# Patient Record
Sex: MALE | Race: WHITE | Employment: OTHER | URBAN - NONMETROPOLITAN AREA
[De-identification: names, ages, dates, MRNs, and addresses within clinical notes are randomized per-mention and may not be internally consistent; named-entity substitution may affect disease eponyms.]

---

## 2019-03-25 ENCOUNTER — HOSPITAL ENCOUNTER (EMERGENCY)
Age: 74
Discharge: ANOTHER HOSPITAL | End: 2019-03-25
Attending: EMERGENCY MEDICINE
Payer: MEDICARE

## 2019-03-25 ENCOUNTER — APPOINTMENT (OUTPATIENT)
Dept: ULTRASOUND IMAGING | Age: 74
DRG: 439 | End: 2019-03-25
Attending: INTERNAL MEDICINE
Payer: MEDICARE

## 2019-03-25 ENCOUNTER — HOSPITAL ENCOUNTER (INPATIENT)
Age: 74
LOS: 4 days | Discharge: HOME | DRG: 439 | End: 2019-03-29
Attending: INTERNAL MEDICINE | Admitting: INTERNAL MEDICINE
Payer: MEDICARE

## 2019-03-25 ENCOUNTER — APPOINTMENT (OUTPATIENT)
Dept: CT IMAGING | Age: 74
End: 2019-03-25
Payer: MEDICARE

## 2019-03-25 VITALS
SYSTOLIC BLOOD PRESSURE: 138 MMHG | TEMPERATURE: 100.3 F | HEART RATE: 72 BPM | RESPIRATION RATE: 20 BRPM | DIASTOLIC BLOOD PRESSURE: 70 MMHG | OXYGEN SATURATION: 99 %

## 2019-03-25 DIAGNOSIS — K85.00 IDIOPATHIC ACUTE PANCREATITIS WITHOUT INFECTION OR NECROSIS: Primary | ICD-10-CM

## 2019-03-25 DIAGNOSIS — D72.829 LEUKOCYTOSIS, UNSPECIFIED TYPE: ICD-10-CM

## 2019-03-25 DIAGNOSIS — I95.9 HYPOTENSION, UNSPECIFIED HYPOTENSION TYPE: ICD-10-CM

## 2019-03-25 DIAGNOSIS — N17.9 ACUTE KIDNEY INJURY (HCC): ICD-10-CM

## 2019-03-25 PROBLEM — K85.90 PANCREATITIS, UNSPECIFIED PANCREATITIS TYPE: Status: ACTIVE | Noted: 2019-03-25

## 2019-03-25 LAB
-: NORMAL
ABSOLUTE EOS #: 0 K/UL (ref 0–0.44)
ABSOLUTE IMMATURE GRANULOCYTE: 0 K/UL (ref 0–0.3)
ABSOLUTE LYMPH #: 1.46 K/UL (ref 1.1–3.7)
ABSOLUTE MONO #: 1.7 K/UL (ref 0.1–1.2)
ALBUMIN SERPL-MCNC: 3.8 G/DL (ref 3.5–5.2)
ALBUMIN SERPL-MCNC: 4.1 G/DL (ref 3.5–5.1)
ALBUMIN/GLOBULIN RATIO: 1.2 (ref 1–2.5)
ALP BLD-CCNC: 145 U/L (ref 40–129)
ALP BLD-CCNC: 165 U/L (ref 38–126)
ALT SERPL-CCNC: 651 U/L (ref 5–41)
ALT SERPL-CCNC: 659 U/L (ref 11–66)
ANION GAP SERPL CALCULATED.3IONS-SCNC: 18 MEQ/L (ref 8–16)
ANION GAP SERPL CALCULATED.3IONS-SCNC: 19 MMOL/L (ref 9–17)
AST SERPL-CCNC: 607 U/L (ref 5–40)
AST SERPL-CCNC: 656 U/L
BASOPHILS # BLD: 0 % (ref 0–2)
BASOPHILS # BLD: 0.2 %
BASOPHILS ABSOLUTE: 0 K/UL (ref 0–0.2)
BASOPHILS ABSOLUTE: 0.1 THOU/MM3 (ref 0–0.1)
BILIRUB SERPL-MCNC: 2.59 MG/DL (ref 0.3–1.2)
BILIRUB SERPL-MCNC: 3.1 MG/DL (ref 0.3–1.2)
BILIRUBIN DIRECT: 2.3 MG/DL (ref 0–0.3)
BUN BLDV-MCNC: 28 MG/DL (ref 7–22)
BUN BLDV-MCNC: 28 MG/DL (ref 8–23)
BUN/CREAT BLD: 15 (ref 9–20)
CALCIUM SERPL-MCNC: 8.6 MG/DL (ref 8.5–10.5)
CALCIUM SERPL-MCNC: 9 MG/DL (ref 8.6–10.4)
CHLORIDE BLD-SCNC: 101 MEQ/L (ref 98–111)
CHLORIDE BLD-SCNC: 99 MMOL/L (ref 98–107)
CO2: 16 MMOL/L (ref 20–31)
CO2: 17 MEQ/L (ref 23–33)
CREAT SERPL-MCNC: 1.4 MG/DL (ref 0.4–1.2)
CREAT SERPL-MCNC: 1.85 MG/DL (ref 0.7–1.2)
DIFFERENTIAL TYPE: ABNORMAL
EOSINOPHIL # BLD: 0 %
EOSINOPHILS ABSOLUTE: 0 THOU/MM3 (ref 0–0.4)
EOSINOPHILS RELATIVE PERCENT: 0 % (ref 1–4)
ERYTHROCYTE [DISTWIDTH] IN BLOOD BY AUTOMATED COUNT: 14.8 % (ref 11.5–14.5)
ERYTHROCYTE [DISTWIDTH] IN BLOOD BY AUTOMATED COUNT: 47.6 FL (ref 35–45)
GFR AFRICAN AMERICAN: 44 ML/MIN
GFR NON-AFRICAN AMERICAN: 36 ML/MIN
GFR SERPL CREATININE-BSD FRML MDRD: 50 ML/MIN/1.73M2
GFR SERPL CREATININE-BSD FRML MDRD: ABNORMAL ML/MIN/{1.73_M2}
GFR SERPL CREATININE-BSD FRML MDRD: ABNORMAL ML/MIN/{1.73_M2}
GLUCOSE BLD-MCNC: 126 MG/DL (ref 70–99)
GLUCOSE BLD-MCNC: 140 MG/DL (ref 70–108)
GLUCOSE BLD-MCNC: 141 MG/DL (ref 70–108)
GLUCOSE BLD-MCNC: 185 MG/DL (ref 70–108)
HCT VFR BLD CALC: 44.1 % (ref 42–52)
HCT VFR BLD CALC: 44.2 % (ref 40.7–50.3)
HEMOGLOBIN: 14.6 G/DL (ref 13–17)
HEMOGLOBIN: 15.3 GM/DL (ref 14–18)
IMMATURE GRANS (ABS): 0.24 THOU/MM3 (ref 0–0.07)
IMMATURE GRANULOCYTES: 0 %
IMMATURE GRANULOCYTES: 0.9 %
LACTIC ACID: 1.3 MMOL/L (ref 0.5–2.2)
LACTIC ACID: 2.5 MMOL/L (ref 0.5–2.2)
LACTIC ACID: 5.5 MMOL/L (ref 0.5–2.2)
LIPASE: >3000 U/L (ref 13–60)
LYMPHOCYTES # BLD: 3 %
LYMPHOCYTES # BLD: 6 % (ref 24–43)
LYMPHOCYTES ABSOLUTE: 0.8 THOU/MM3 (ref 1–4.8)
MCH RBC QN AUTO: 30.4 PG (ref 25.2–33.5)
MCH RBC QN AUTO: 30.6 PG (ref 26–33)
MCHC RBC AUTO-ENTMCNC: 33 G/DL (ref 28.4–34.8)
MCHC RBC AUTO-ENTMCNC: 34.7 GM/DL (ref 32.2–35.5)
MCV RBC AUTO: 88.2 FL (ref 80–94)
MCV RBC AUTO: 91.9 FL (ref 82.6–102.9)
MONOCYTES # BLD: 4.4 %
MONOCYTES # BLD: 7 % (ref 3–12)
MONOCYTES ABSOLUTE: 1.2 THOU/MM3 (ref 0.4–1.3)
MORPHOLOGY: NORMAL
MRSA SCREEN RT-PCR: NEGATIVE
NRBC AUTOMATED: 0 PER 100 WBC
NUCLEATED RED BLOOD CELLS: 0 /100 WBC
PDW BLD-RTO: 14.4 % (ref 11.8–14.4)
PLATELET # BLD: 159 THOU/MM3 (ref 130–400)
PLATELET # BLD: 230 K/UL (ref 138–453)
PLATELET ESTIMATE: ABNORMAL
PMV BLD AUTO: 10.4 FL (ref 9.4–12.4)
PMV BLD AUTO: 10.9 FL (ref 8.1–13.5)
POTASSIUM REFLEX MAGNESIUM: 4.8 MEQ/L (ref 3.5–5.2)
POTASSIUM SERPL-SCNC: 3.9 MMOL/L (ref 3.7–5.3)
RBC # BLD: 4.81 M/UL (ref 4.21–5.77)
RBC # BLD: 5 MILL/MM3 (ref 4.7–6.1)
RBC # BLD: ABNORMAL 10*6/UL
REASON FOR REJECTION: NORMAL
SCAN OF BLOOD SMEAR: NORMAL
SEG NEUTROPHILS: 87 % (ref 36–65)
SEG NEUTROPHILS: 91.5 %
SEGMENTED NEUTROPHILS ABSOLUTE COUNT: 21.14 K/UL (ref 1.5–8.1)
SEGMENTED NEUTROPHILS ABSOLUTE COUNT: 24 THOU/MM3 (ref 1.8–7.7)
SODIUM BLD-SCNC: 134 MMOL/L (ref 135–144)
SODIUM BLD-SCNC: 136 MEQ/L (ref 135–145)
TOTAL PROTEIN: 6.9 G/DL (ref 6.1–8)
TOTAL PROTEIN: 7 G/DL (ref 6.4–8.3)
TROPONIN INTERP: NORMAL
TROPONIN T: <0.03 NG/ML
TROPONIN, HIGH SENSITIVITY: NORMAL NG/L (ref 0–22)
VANCOMYCIN RESISTANT ENTEROCOCCUS: NEGATIVE
WBC # BLD: 24.3 K/UL (ref 3.5–11.3)
WBC # BLD: 26.2 THOU/MM3 (ref 4.8–10.8)
WBC # BLD: ABNORMAL 10*3/UL
ZZ NTE CLEAN UP: ORDERED TEST: NORMAL
ZZ NTE WITH NAME CLEAN UP: SPECIMEN SOURCE: NORMAL

## 2019-03-25 PROCEDURE — 2580000003 HC RX 258: Performed by: NURSE PRACTITIONER

## 2019-03-25 PROCEDURE — 76770 US EXAM ABDO BACK WALL COMP: CPT

## 2019-03-25 PROCEDURE — 83690 ASSAY OF LIPASE: CPT

## 2019-03-25 PROCEDURE — 93005 ELECTROCARDIOGRAM TRACING: CPT

## 2019-03-25 PROCEDURE — 87149 DNA/RNA DIRECT PROBE: CPT

## 2019-03-25 PROCEDURE — 99223 1ST HOSP IP/OBS HIGH 75: CPT | Performed by: INTERNAL MEDICINE

## 2019-03-25 PROCEDURE — 76705 ECHO EXAM OF ABDOMEN: CPT

## 2019-03-25 PROCEDURE — 6360000002 HC RX W HCPCS

## 2019-03-25 PROCEDURE — 6360000002 HC RX W HCPCS: Performed by: EMERGENCY MEDICINE

## 2019-03-25 PROCEDURE — 87081 CULTURE SCREEN ONLY: CPT

## 2019-03-25 PROCEDURE — 2000000000 HC ICU R&B

## 2019-03-25 PROCEDURE — 82948 REAGENT STRIP/BLOOD GLUCOSE: CPT

## 2019-03-25 PROCEDURE — 80048 BASIC METABOLIC PNL TOTAL CA: CPT

## 2019-03-25 PROCEDURE — 96375 TX/PRO/DX INJ NEW DRUG ADDON: CPT

## 2019-03-25 PROCEDURE — 74176 CT ABD & PELVIS W/O CONTRAST: CPT

## 2019-03-25 PROCEDURE — 87205 SMEAR GRAM STAIN: CPT

## 2019-03-25 PROCEDURE — 87500 VANOMYCIN DNA AMP PROBE: CPT

## 2019-03-25 PROCEDURE — 87077 CULTURE AEROBIC IDENTIFY: CPT

## 2019-03-25 PROCEDURE — 84484 ASSAY OF TROPONIN QUANT: CPT

## 2019-03-25 PROCEDURE — 6360000002 HC RX W HCPCS: Performed by: NURSE PRACTITIONER

## 2019-03-25 PROCEDURE — 87186 SC STD MICRODIL/AGAR DIL: CPT

## 2019-03-25 PROCEDURE — 80076 HEPATIC FUNCTION PANEL: CPT

## 2019-03-25 PROCEDURE — 94761 N-INVAS EAR/PLS OXIMETRY MLT: CPT

## 2019-03-25 PROCEDURE — 6360000002 HC RX W HCPCS: Performed by: INTERNAL MEDICINE

## 2019-03-25 PROCEDURE — 85025 COMPLETE CBC W/AUTO DIFF WBC: CPT

## 2019-03-25 PROCEDURE — 2580000003 HC RX 258: Performed by: EMERGENCY MEDICINE

## 2019-03-25 PROCEDURE — 80053 COMPREHEN METABOLIC PANEL: CPT

## 2019-03-25 PROCEDURE — 96368 THER/DIAG CONCURRENT INF: CPT

## 2019-03-25 PROCEDURE — 36415 COLL VENOUS BLD VENIPUNCTURE: CPT

## 2019-03-25 PROCEDURE — 99285 EMERGENCY DEPT VISIT HI MDM: CPT

## 2019-03-25 PROCEDURE — APPSS180 APP SPLIT SHARED TIME > 60 MINUTES: Performed by: NURSE PRACTITIONER

## 2019-03-25 PROCEDURE — 87641 MR-STAPH DNA AMP PROBE: CPT

## 2019-03-25 PROCEDURE — 96365 THER/PROPH/DIAG IV INF INIT: CPT

## 2019-03-25 PROCEDURE — 87040 BLOOD CULTURE FOR BACTERIA: CPT

## 2019-03-25 PROCEDURE — 83605 ASSAY OF LACTIC ACID: CPT

## 2019-03-25 PROCEDURE — 2709999900 HC NON-CHARGEABLE SUPPLY

## 2019-03-25 RX ORDER — FENTANYL CITRATE 50 UG/ML
25 INJECTION, SOLUTION INTRAMUSCULAR; INTRAVENOUS ONCE
Status: COMPLETED | OUTPATIENT
Start: 2019-03-25 | End: 2019-03-25

## 2019-03-25 RX ORDER — SODIUM CHLORIDE, SODIUM LACTATE, POTASSIUM CHLORIDE, CALCIUM CHLORIDE 600; 310; 30; 20 MG/100ML; MG/100ML; MG/100ML; MG/100ML
INJECTION, SOLUTION INTRAVENOUS ONCE
Status: DISCONTINUED | OUTPATIENT
Start: 2019-03-25 | End: 2019-03-25

## 2019-03-25 RX ORDER — MEPERIDINE HYDROCHLORIDE 25 MG/ML
50 INJECTION INTRAMUSCULAR; INTRAVENOUS; SUBCUTANEOUS EVERY 4 HOURS PRN
Status: DISCONTINUED | OUTPATIENT
Start: 2019-03-25 | End: 2019-03-29 | Stop reason: HOSPADM

## 2019-03-25 RX ORDER — LISINOPRIL 10 MG/1
10 TABLET ORAL DAILY
COMMUNITY

## 2019-03-25 RX ORDER — NICOTINE POLACRILEX 4 MG
15 LOZENGE BUCCAL PRN
Status: DISCONTINUED | OUTPATIENT
Start: 2019-03-25 | End: 2019-03-29 | Stop reason: HOSPADM

## 2019-03-25 RX ORDER — MORPHINE SULFATE 2 MG/ML
INJECTION, SOLUTION INTRAMUSCULAR; INTRAVENOUS
Status: COMPLETED
Start: 2019-03-25 | End: 2019-03-25

## 2019-03-25 RX ORDER — MEPERIDINE HYDROCHLORIDE 50 MG/ML
50 INJECTION INTRAMUSCULAR; INTRAVENOUS; SUBCUTANEOUS ONCE
Status: DISCONTINUED | OUTPATIENT
Start: 2019-03-25 | End: 2019-03-25

## 2019-03-25 RX ORDER — MEPERIDINE HYDROCHLORIDE 25 MG/ML
50 INJECTION INTRAMUSCULAR; INTRAVENOUS; SUBCUTANEOUS ONCE
Status: COMPLETED | OUTPATIENT
Start: 2019-03-25 | End: 2019-03-25

## 2019-03-25 RX ORDER — MORPHINE SULFATE 4 MG/ML
INJECTION, SOLUTION INTRAMUSCULAR; INTRAVENOUS
Status: COMPLETED
Start: 2019-03-25 | End: 2019-03-25

## 2019-03-25 RX ORDER — ONDANSETRON 2 MG/ML
4 INJECTION INTRAMUSCULAR; INTRAVENOUS EVERY 6 HOURS PRN
Status: DISCONTINUED | OUTPATIENT
Start: 2019-03-25 | End: 2019-03-29 | Stop reason: HOSPADM

## 2019-03-25 RX ORDER — DEXTROSE MONOHYDRATE 25 G/50ML
12.5 INJECTION, SOLUTION INTRAVENOUS PRN
Status: DISCONTINUED | OUTPATIENT
Start: 2019-03-25 | End: 2019-03-29 | Stop reason: HOSPADM

## 2019-03-25 RX ORDER — MORPHINE SULFATE 2 MG/ML
2 INJECTION, SOLUTION INTRAMUSCULAR; INTRAVENOUS ONCE
Status: COMPLETED | OUTPATIENT
Start: 2019-03-25 | End: 2019-03-25

## 2019-03-25 RX ORDER — SODIUM CHLORIDE, SODIUM LACTATE, POTASSIUM CHLORIDE, CALCIUM CHLORIDE 600; 310; 30; 20 MG/100ML; MG/100ML; MG/100ML; MG/100ML
INJECTION, SOLUTION INTRAVENOUS ONCE
Status: COMPLETED | OUTPATIENT
Start: 2019-03-25 | End: 2019-03-25

## 2019-03-25 RX ORDER — SODIUM CHLORIDE 9 MG/ML
INJECTION, SOLUTION INTRAVENOUS ONCE
Status: COMPLETED | OUTPATIENT
Start: 2019-03-25 | End: 2019-03-25

## 2019-03-25 RX ORDER — SODIUM CHLORIDE 9 MG/ML
INJECTION, SOLUTION INTRAVENOUS CONTINUOUS
Status: DISCONTINUED | OUTPATIENT
Start: 2019-03-25 | End: 2019-03-26

## 2019-03-25 RX ORDER — MEPERIDINE HYDROCHLORIDE 25 MG/ML
INJECTION INTRAMUSCULAR; INTRAVENOUS; SUBCUTANEOUS
Status: COMPLETED
Start: 2019-03-25 | End: 2019-03-25

## 2019-03-25 RX ORDER — MORPHINE SULFATE 4 MG/ML
4 INJECTION, SOLUTION INTRAMUSCULAR; INTRAVENOUS ONCE
Status: COMPLETED | OUTPATIENT
Start: 2019-03-25 | End: 2019-03-25

## 2019-03-25 RX ORDER — ONDANSETRON 2 MG/ML
4 INJECTION INTRAMUSCULAR; INTRAVENOUS ONCE
Status: COMPLETED | OUTPATIENT
Start: 2019-03-25 | End: 2019-03-25

## 2019-03-25 RX ORDER — MORPHINE SULFATE 2 MG/ML
2 INJECTION, SOLUTION INTRAMUSCULAR; INTRAVENOUS
Status: DISCONTINUED | OUTPATIENT
Start: 2019-03-25 | End: 2019-03-26

## 2019-03-25 RX ORDER — SODIUM CHLORIDE 0.9 % (FLUSH) 0.9 %
10 SYRINGE (ML) INJECTION PRN
Status: DISCONTINUED | OUTPATIENT
Start: 2019-03-25 | End: 2019-03-29 | Stop reason: HOSPADM

## 2019-03-25 RX ORDER — DEXTROSE MONOHYDRATE 50 MG/ML
100 INJECTION, SOLUTION INTRAVENOUS PRN
Status: DISCONTINUED | OUTPATIENT
Start: 2019-03-25 | End: 2019-03-29 | Stop reason: HOSPADM

## 2019-03-25 RX ORDER — SODIUM CHLORIDE 0.9 % (FLUSH) 0.9 %
10 SYRINGE (ML) INJECTION EVERY 12 HOURS SCHEDULED
Status: DISCONTINUED | OUTPATIENT
Start: 2019-03-25 | End: 2019-03-29 | Stop reason: HOSPADM

## 2019-03-25 RX ORDER — SIMVASTATIN 20 MG
20 TABLET ORAL NIGHTLY
COMMUNITY

## 2019-03-25 RX ADMIN — MEROPENEM 1 G: 1 INJECTION, POWDER, FOR SOLUTION INTRAVENOUS at 19:03

## 2019-03-25 RX ADMIN — ONDANSETRON 4 MG: 2 INJECTION INTRAMUSCULAR; INTRAVENOUS at 12:22

## 2019-03-25 RX ADMIN — SODIUM CHLORIDE: 9 INJECTION, SOLUTION INTRAVENOUS at 01:19

## 2019-03-25 RX ADMIN — MEPERIDINE HYDROCHLORIDE 50 MG: 25 INJECTION INTRAMUSCULAR; INTRAVENOUS; SUBCUTANEOUS at 12:19

## 2019-03-25 RX ADMIN — MORPHINE SULFATE 2 MG: 2 INJECTION, SOLUTION INTRAMUSCULAR; INTRAVENOUS at 18:59

## 2019-03-25 RX ADMIN — ONDANSETRON 4 MG: 2 INJECTION INTRAMUSCULAR; INTRAVENOUS at 01:19

## 2019-03-25 RX ADMIN — MORPHINE SULFATE: 4 INJECTION, SOLUTION INTRAMUSCULAR; INTRAVENOUS at 08:30

## 2019-03-25 RX ADMIN — SODIUM CHLORIDE: 9 INJECTION, SOLUTION INTRAVENOUS at 16:33

## 2019-03-25 RX ADMIN — MEPERIDINE HYDROCHLORIDE 50 MG: 25 INJECTION INTRAMUSCULAR; INTRAVENOUS; SUBCUTANEOUS at 16:22

## 2019-03-25 RX ADMIN — PIPERACILLIN SODIUM,TAZOBACTAM SODIUM 3.38 G: 3; .375 INJECTION, POWDER, FOR SOLUTION INTRAVENOUS at 02:19

## 2019-03-25 RX ADMIN — MORPHINE SULFATE: 2 INJECTION, SOLUTION INTRAMUSCULAR; INTRAVENOUS at 18:59

## 2019-03-25 RX ADMIN — MORPHINE SULFATE 4 MG: 4 INJECTION INTRAVENOUS at 08:25

## 2019-03-25 RX ADMIN — SODIUM CHLORIDE: 9 INJECTION, SOLUTION INTRAVENOUS at 10:50

## 2019-03-25 RX ADMIN — MEPERIDINE HYDROCHLORIDE: 25 INJECTION, SOLUTION INTRAMUSCULAR; INTRAVENOUS; SUBCUTANEOUS at 10:45

## 2019-03-25 RX ADMIN — MORPHINE SULFATE 2 MG: 2 INJECTION, SOLUTION INTRAMUSCULAR; INTRAVENOUS at 23:23

## 2019-03-25 RX ADMIN — SODIUM CHLORIDE, POTASSIUM CHLORIDE, SODIUM LACTATE AND CALCIUM CHLORIDE: 600; 310; 30; 20 INJECTION, SOLUTION INTRAVENOUS at 01:42

## 2019-03-25 RX ADMIN — MORPHINE SULFATE 2 MG: 2 INJECTION, SOLUTION INTRAMUSCULAR; INTRAVENOUS at 21:38

## 2019-03-25 RX ADMIN — FENTANYL CITRATE 25 MCG: 50 INJECTION, SOLUTION INTRAMUSCULAR; INTRAVENOUS at 04:26

## 2019-03-25 RX ADMIN — MEPERIDINE HYDROCHLORIDE 50 MG: 25 INJECTION, SOLUTION INTRAMUSCULAR; INTRAVENOUS; SUBCUTANEOUS at 10:51

## 2019-03-25 RX ADMIN — MEPERIDINE HYDROCHLORIDE 50 MG: 25 INJECTION INTRAMUSCULAR; INTRAVENOUS; SUBCUTANEOUS at 20:03

## 2019-03-25 ASSESSMENT — ENCOUNTER SYMPTOMS
SORE THROAT: 0
SINUS PRESSURE: 0
ABDOMINAL PAIN: 0
SINUS PAIN: 0
VOMITING: 0
CHEST TIGHTNESS: 0
EYE PAIN: 0
SHORTNESS OF BREATH: 0
ABDOMINAL PAIN: 1
VOMITING: 0
BACK PAIN: 0
COLOR CHANGE: 0
SHORTNESS OF BREATH: 0
DIARRHEA: 1
PHOTOPHOBIA: 0
NAUSEA: 0
COUGH: 0
EYE DISCHARGE: 0
NAUSEA: 0
CONSTIPATION: 0
ABDOMINAL DISTENTION: 1
SINUS PAIN: 0
WHEEZING: 0
TROUBLE SWALLOWING: 0
DIARRHEA: 0
SINUS PRESSURE: 0

## 2019-03-25 ASSESSMENT — PAIN DESCRIPTION - DESCRIPTORS
DESCRIPTORS: ACHING;CONSTANT
DESCRIPTORS: ACHING

## 2019-03-25 ASSESSMENT — PAIN DESCRIPTION - PAIN TYPE
TYPE: ACUTE PAIN

## 2019-03-25 ASSESSMENT — PAIN SCALES - GENERAL
PAINLEVEL_OUTOF10: 7
PAINLEVEL_OUTOF10: 6
PAINLEVEL_OUTOF10: 5
PAINLEVEL_OUTOF10: 9
PAINLEVEL_OUTOF10: 8
PAINLEVEL_OUTOF10: 9
PAINLEVEL_OUTOF10: 7
PAINLEVEL_OUTOF10: 7
PAINLEVEL_OUTOF10: 8
PAINLEVEL_OUTOF10: 8
PAINLEVEL_OUTOF10: 9
PAINLEVEL_OUTOF10: 9

## 2019-03-25 ASSESSMENT — PAIN DESCRIPTION - LOCATION
LOCATION: ABDOMEN

## 2019-03-25 ASSESSMENT — PAIN DESCRIPTION - FREQUENCY
FREQUENCY: CONTINUOUS

## 2019-03-25 NOTE — H&P
Assessment and Plan:        1. Acute on chronic pancreatitis- 3rd epioside since June 2018. Patient states this is his first hospitalization for pancreatitis, others were managed outpatient. Morphine 4 mg, demerol 50 mg once. Lipase >3,000. BISAP score 0. Keep patient NPO, no need for NG if no nausea and vomiting. Demerol 50 mg Q 4 PRN   2. Hyponatremia- Resolved; 0.9% NS @ 150 ml   3. Anion gap Metabolic acidosis - CO2 17, anion gap 18, fluids 0.9 @ 150 ml/hr, monitor   4. Leukocytosis- WBC 26.2, monitor, no fever at this time   5. JEANNE- Improving, creatinine 1.4, BUN 28. Continue 0.9 @ 150 ml/hr. 6. Lactic acidosis- Improving, 5.5 at [place], now 2.5. Continue hydration, trend repeat @1700  7. ALT>AST-, , NPO monitor, holding home Zocor   8. Hyperlipidemia- Home Zocor on hold   9. Hypertension- History, currently normotensive. Holding home lisinopril 10 mg daily. Will start IV medication if needed for hypertension   10. Hyperglycemia- sugar 126, 185. AC/HS low dose insulin scale in setting of pancreatitis            CC:  Pancreatitis   HPI: Anthony Chavarria is a  68year old white male who presented to [facility] with acute abdominal pain. He has a past medical history of hyperlipidemia and hypertension. He states that he has had pancreatis in June of 2018 and 3 times since then but was not hospitalized for these cases. He states that he had 2 beers yesterday and started having intense abdominal pain. He denies any nausea or vomiting and states he had one diarrhea stool at home. Prior to this episode of drinking he states that he had not had a drink since the June 2018 episode of pancreatis. He rates the pain 10/10 and states that nothing improves the pain. He describes it as sharp and shooting into his belly.      Review of Systems   Constitutional: Positive for diaphoresis. Negative for activity change, chills, fatigue and fever.    HENT: Negative for sinus pressure, sinus pain and trouble

## 2019-03-25 NOTE — SIGNIFICANT EVENT
Notified that patients blood cultures from [facility] were positive for gram negative rods, started patient on Meropenem 1 gram Q 8 hours. Collaborative with Dr. Isaiah Bobo on ATB therapy.

## 2019-03-25 NOTE — PROGRESS NOTES
Assessment and Plan:        1. Acute on chronic pancreatitis- 3rd epioside since June 2018. Patient states this is his first hospitalization for pancreatitis, others were managed outpatient. Morphine 4 mg, demerol 50 mg once. Lipase >3,000. BISAP score 0. Keep patient NPO, no need for NG if no nausea and vomiting. Demerol 50 mg Q 4 PRN   2. Hyponatremia- Resolved; 0.9% NS @ 150 ml   3. Anion gap Metabolic acidosis - CO2 17, anion gap 18, fluids 0.9 @ 150 ml/hr, monitor   4. Leukocytosis- WBC 26.2, monitor, no fever at this time   5. JEANNE- Improving, creatinine 1.4, BUN 28. Continue 0.9 @ 150 ml/hr. 6. Lactic acidosis- Improving, 5.5 at [place], now 2.5. Continue hydration, trend repeat @1700  7. ALT>AST-, , NPO monitor, holding home Zocor   8. Hyperlipidemia- Home Zocor on hold   9. Hypertension- History, currently normotensive. Holding home lisinopril 10 mg daily. Will start IV medication if needed for hypertension   10. Hyperglycemia- sugar 126, 185. AC/HS low dose insulin scale in setting of pancreatitis          CC:  Pancreatitis   HPI: Dakota Washburn is a  68year old white male who presented to [facility] with acute abdominal pain. He has a past medical history of hyperlipidemia and hypertension. He states that he has had pancreatis in June of 2018 and 3 times since then but was not hospitalized for these cases. He states that he had 2 beers yesterday and started having intense abdominal pain. He denies any nausea or vomiting and states he had one diarrhea stool at home. Prior to this episode of drinking he states that he had not had a drink since the June 2018 episode of pancreatis. He rates the pain 10/10 and states that nothing improves the pain. He describes it as sharp and shooting into his belly. Review of Systems   Constitutional: Positive for diaphoresis. Negative for activity change, chills, fatigue and fever.    HENT: Negative for sinus pressure, sinus pain and trouble swallowing. Eyes: Negative for photophobia and visual disturbance. Respiratory: Negative for chest tightness, shortness of breath and wheezing. Cardiovascular: Negative for chest pain and leg swelling. Gastrointestinal: Positive for abdominal distention, abdominal pain and diarrhea. Negative for constipation, nausea and vomiting. Endocrine: Negative for polydipsia, polyphagia and polyuria. Genitourinary: Negative for dysuria, hematuria and urgency. Musculoskeletal: Negative for back pain and neck pain. Skin: Negative for color change, pallor and rash. Allergic/Immunologic: Negative for environmental allergies and food allergies. Neurological: Negative for dizziness, syncope, weakness and headaches. Hematological: Negative for adenopathy. Psychiatric/Behavioral: Negative for behavioral problems. The patient is not hyperactive. PMH:  Per HPI  SHX:  Reformed smoker, states quit 30 years ago. Denies drug use. Quit drinking in June 2018, recently started again, states 2 beers yesterday  FHX:   Allergies: NKDA  Medications:     sodium chloride 150 mL/hr at 03/25/19 1050      morphine        sodium chloride flush  10 mL Intravenous 2 times per day    meperidine           sodium chloride flush 10 mL PRN   ondansetron 4 mg Q6H PRN   magnesium hydroxide 30 mL Daily PRN     Vital Signs: T: 97.6 P: 69 RR: 13 B/P: 140/72: O2 Sat:99: I/O: 0  CAM-ICU:   Negative   General:   Acutely ill appearing male   HEENT:  normocephalic and atraumatic. No scleral icterus. Neck: supple. No JVD. Lungs: clear to auscultation. No retractions  Cardiac: RRR  Abdomen: firm, tender, active bowel sounds   Extremities:  No clubbing, cyanosis, or edema x 4. Vasculature: capillary refill < 3 seconds. Skin:  warm and dry. Psych:  Alert and oriented x3. Affect appropriate  Lymph:  No supraclavicular adenopathy. Neurologic:  No focal deficit.     Data: (All radiographs, tracings, PFTs, and imaging are personally viewed and interpreted unless otherwise noted).     Telemetry shows sinus r   Sodium 134, potassium 3.9, Chloride 99, CO2, 16, BUN 28, creatinine 1.85, Lactic 5.5, glucose 126, Alk Phos 145, , , BiIlirubin 2.59, Lipase >3,000, WBC 24.3, hemoglobin 14.6, Hematocrit 44.2   CT Abdomen pelvis acute uncomplicated pancreatitis    EKG shows Normal sinus rhythm    Case and plan discussed with Dr. Declan Person

## 2019-03-25 NOTE — CARE COORDINATION
3/25/19, 11:22 AM      Aisha Velásquez       Admitted from: [address] from [facility] 3/25/2019/ [address] day: 0   Location: -14/014-A Reason for admit: Pancreatitis, unspecified pancreatitis type [K85.90] Status: IP  Admit order signed?: no  PMH:  has a past medical history of Hyperlipidemia. Procedure: none  Pertinent abnormal Imaging:  3/25 CT Abd/pelvis: Findings consistent with acute uncomplicated pancreatitis  Medications:  Scheduled Meds:   morphine        sodium chloride flush  10 mL Intravenous 2 times per day    meperidine         Continuous Infusions:   sodium chloride 150 mL/hr at 03/25/19 1050      Pertinent Info/Orders/Treatment Plan: Presented to [facility] with c/o acute abdominal pain after drinking 2 beers. Reports prior to this, had not had a drink since his June 2018 episode of pancreatits. Transferred to [facility] d/t hypotension. No hypotension noted enroute and none since arrival. Sats 97% on 2L O2. Tmax 100.3. Telemetry, I&O, daily weight, SCDs. IVF, SSI ACHS, prn IV demerol, prn zofran. Na+ 134 - now 136, CO2 17, BUN 28, Creat 1.85 - now 1.4, AG 18, LA 5.5 - now 2.5, trop neg, alk phos 145 - now 165,  - now 659,  - now 607, bili 2.59 - now 3.1, direct bili 2.3, lipase >3000, wbc 24.3 - now 26.2. Diet: Diet NPO Effective Now   Smoking status:  reports that he has never smoked. He has never used smokeless tobacco.   PCP: Julia Rausch MD  Readmission: no  Readmission Risk Score: 8%    Discharge Planning  Current Residence:     Living Arrangements:      Support Systems:     Current Services PTA:     Potential Assistance Needed:     Potential Assistance Purchasing Medications:     Does patient want to participate in local refill/ meds to beds program?     Type of Home Care Services:     Patient expects to be discharged to:     Expected Discharge date:      Follow Up Appointment: Best Day/ Time:      Discharge Plan: Spoke with Viridiana Garcia; states he lives at home alone and did not use any DME or have any HH services PTA. He drives, cares for himself independently, and has PCP. Monique Duenas states he plans to return home with his wife at discharge, denies needs, and declines [facility] stating he doesn't think he will need it.       Evaluation: no

## 2019-03-25 NOTE — PROGRESS NOTES
Pt received into 4d14 via EMS cart pt stepped off of cart to use the bathroom, RN assist with ambulation, pt voided then assisted into ICU bed, connected to monitor, nbp and pulse ox, assessment completed, squad reports pts daughter is in route. mrsa , VRE swabs obtained, sent to lab, Pink foam patch applied to cocyx area     0830  Pt reports to having pain rated a 5, RN speaks with Dr Mago Valiente who is in the middle of a bedside procedure, verbal order received. 8555  Pt instructed in pain med, RN confirms NKA, Morphine 4mg IVP given and flushed.       0836  Pt resting eyes closed, O2 sats 89% O2 applied at 2L NC, RN stays with pt till sats are greater than 92%     0844 pts daughter arrives, update given,  Plan of care reviewed, daughter is going back out to waiting area and down to cafeteria     1030  Lab In for blood draw, pt dariana well     1045  Demerol 50mg given Ivp for pain control, cool cloth applied to pts forehead, pillows tucked attempting to help make comfortable     1109  Lab in for blood draw, pt dariana well    1219  Demerol 50mg given IVP for pain control     1222 Zofran given for nausea, and some slight belching     1622  Demerol 50mg IVP given for pain control , pt states not nauseated at this time     1650  Lab in for blood draw, pt dariana well     1728  No word from [address] attempts to reach them, no answer, message left     1747  US called will be up within the hour, pts daughter updated     1705  Lab results from [facility] on Blood Cultures x2, anaerobic and aerobic gram - rods, results given to Dr Mago Valiente, no orders at this time     Evaristo 36 with Dr Mago Valiente about breakthru pain after demerol 50mg given, they will add Morphine 2mg, pt and daughter upated on plan of care    1925  Report given to Lion Hanson

## 2019-03-25 NOTE — PLAN OF CARE
Problem: Anxiety/Stress:  Goal: Level of anxiety will decrease  Description  Level of anxiety will decrease  Outcome: Ongoing  Note:   Pt has lack of sleep r/t constant pain, worried about sleeping and not getting pain conttrol      Problem: Pain:  Goal: Pain level will decrease  Description  Pain level will decrease  Outcome: Ongoing     Problem: Pain:  Goal: Recognizes and communicates pain  Description  Recognizes and communicates pain  Outcome: Ongoing  Note:   Pt able to ask for pain med when needed      Problem: Pain:  Goal: Control of acute pain  Description  Control of acute pain  Outcome: Ongoing    Care plan reviewed with patient and daughters   Patient and daughters verbalize understanding of the plan of care and contribute to goal setting.

## 2019-03-26 LAB
ALBUMIN SERPL-MCNC: 3.2 G/DL (ref 3.5–5.1)
ALP BLD-CCNC: 131 U/L (ref 38–126)
ALT SERPL-CCNC: 377 U/L (ref 11–66)
AMYLASE: 1162 U/L (ref 20–104)
ANION GAP SERPL CALCULATED.3IONS-SCNC: 11 MEQ/L (ref 8–16)
AST SERPL-CCNC: 227 U/L (ref 5–40)
BASOPHILS # BLD: 0.1 %
BASOPHILS ABSOLUTE: 0 THOU/MM3 (ref 0–0.1)
BILIRUB SERPL-MCNC: 1.2 MG/DL (ref 0.3–1.2)
BILIRUBIN DIRECT: 0.5 MG/DL (ref 0–0.3)
BUN BLDV-MCNC: 27 MG/DL (ref 7–22)
CALCIUM SERPL-MCNC: 7.8 MG/DL (ref 8.5–10.5)
CHLORIDE BLD-SCNC: 107 MEQ/L (ref 98–111)
CO2: 22 MEQ/L (ref 23–33)
CREAT SERPL-MCNC: 0.9 MG/DL (ref 0.4–1.2)
EOSINOPHIL # BLD: 0 %
EOSINOPHILS ABSOLUTE: 0 THOU/MM3 (ref 0–0.4)
ERYTHROCYTE [DISTWIDTH] IN BLOOD BY AUTOMATED COUNT: 15.3 % (ref 11.5–14.5)
ERYTHROCYTE [DISTWIDTH] IN BLOOD BY AUTOMATED COUNT: 51.8 FL (ref 35–45)
GFR SERPL CREATININE-BSD FRML MDRD: 83 ML/MIN/1.73M2
GLUCOSE BLD-MCNC: 116 MG/DL (ref 70–108)
GLUCOSE BLD-MCNC: 129 MG/DL (ref 70–108)
GLUCOSE BLD-MCNC: 145 MG/DL (ref 70–108)
HCT VFR BLD CALC: 44.2 % (ref 42–52)
HEMOGLOBIN: 14.6 GM/DL (ref 14–18)
IMMATURE GRANS (ABS): 0.13 THOU/MM3 (ref 0–0.07)
IMMATURE GRANULOCYTES: 0.7 %
LIPASE: 1413.2 U/L (ref 5.6–51.3)
LYMPHOCYTES # BLD: 3.3 %
LYMPHOCYTES ABSOLUTE: 0.6 THOU/MM3 (ref 1–4.8)
MCH RBC QN AUTO: 30.4 PG (ref 26–33)
MCHC RBC AUTO-ENTMCNC: 33 GM/DL (ref 32.2–35.5)
MCV RBC AUTO: 92.1 FL (ref 80–94)
MONOCYTES # BLD: 5.1 %
MONOCYTES ABSOLUTE: 1 THOU/MM3 (ref 0.4–1.3)
NUCLEATED RED BLOOD CELLS: 0 /100 WBC
PHOSPHORUS: 2.5 MG/DL (ref 2.4–4.7)
PLATELET # BLD: 185 THOU/MM3 (ref 130–400)
PMV BLD AUTO: 11.3 FL (ref 9.4–12.4)
POTASSIUM REFLEX MAGNESIUM: 4.7 MEQ/L (ref 3.5–5.2)
RBC # BLD: 4.8 MILL/MM3 (ref 4.7–6.1)
SEG NEUTROPHILS: 90.8 %
SEGMENTED NEUTROPHILS ABSOLUTE COUNT: 17.8 THOU/MM3 (ref 1.8–7.7)
SODIUM BLD-SCNC: 140 MEQ/L (ref 135–145)
TOTAL PROTEIN: 6.4 G/DL (ref 6.1–8)
WBC # BLD: 19.6 THOU/MM3 (ref 4.8–10.8)

## 2019-03-26 PROCEDURE — 83690 ASSAY OF LIPASE: CPT

## 2019-03-26 PROCEDURE — 6360000002 HC RX W HCPCS: Performed by: NURSE PRACTITIONER

## 2019-03-26 PROCEDURE — 2580000003 HC RX 258: Performed by: NURSE PRACTITIONER

## 2019-03-26 PROCEDURE — 36415 COLL VENOUS BLD VENIPUNCTURE: CPT

## 2019-03-26 PROCEDURE — 2709999900 HC NON-CHARGEABLE SUPPLY

## 2019-03-26 PROCEDURE — 85025 COMPLETE CBC W/AUTO DIFF WBC: CPT

## 2019-03-26 PROCEDURE — 84100 ASSAY OF PHOSPHORUS: CPT

## 2019-03-26 PROCEDURE — 82150 ASSAY OF AMYLASE: CPT

## 2019-03-26 PROCEDURE — 80076 HEPATIC FUNCTION PANEL: CPT

## 2019-03-26 PROCEDURE — 2060000000 HC ICU INTERMEDIATE R&B

## 2019-03-26 PROCEDURE — 99233 SBSQ HOSP IP/OBS HIGH 50: CPT | Performed by: INTERNAL MEDICINE

## 2019-03-26 PROCEDURE — 80048 BASIC METABOLIC PNL TOTAL CA: CPT

## 2019-03-26 PROCEDURE — 82948 REAGENT STRIP/BLOOD GLUCOSE: CPT

## 2019-03-26 RX ORDER — KETOROLAC TROMETHAMINE 30 MG/ML
15 INJECTION, SOLUTION INTRAMUSCULAR; INTRAVENOUS EVERY 8 HOURS
Status: DISCONTINUED | OUTPATIENT
Start: 2019-03-26 | End: 2019-03-29 | Stop reason: HOSPADM

## 2019-03-26 RX ORDER — HYDRALAZINE HYDROCHLORIDE 20 MG/ML
5 INJECTION INTRAMUSCULAR; INTRAVENOUS EVERY 6 HOURS PRN
Status: DISCONTINUED | OUTPATIENT
Start: 2019-03-26 | End: 2019-03-29 | Stop reason: HOSPADM

## 2019-03-26 RX ORDER — ASPIRIN 300 MG/1
300 SUPPOSITORY RECTAL EVERY 6 HOURS PRN
Status: DISCONTINUED | OUTPATIENT
Start: 2019-03-26 | End: 2019-03-29 | Stop reason: HOSPADM

## 2019-03-26 RX ORDER — SODIUM CHLORIDE, SODIUM LACTATE, POTASSIUM CHLORIDE, CALCIUM CHLORIDE 600; 310; 30; 20 MG/100ML; MG/100ML; MG/100ML; MG/100ML
INJECTION, SOLUTION INTRAVENOUS CONTINUOUS
Status: DISCONTINUED | OUTPATIENT
Start: 2019-03-26 | End: 2019-03-29 | Stop reason: HOSPADM

## 2019-03-26 RX ADMIN — MEROPENEM 1 G: 1 INJECTION, POWDER, FOR SOLUTION INTRAVENOUS at 10:58

## 2019-03-26 RX ADMIN — MEPERIDINE HYDROCHLORIDE 50 MG: 25 INJECTION INTRAMUSCULAR; INTRAVENOUS; SUBCUTANEOUS at 13:19

## 2019-03-26 RX ADMIN — MEPERIDINE HYDROCHLORIDE 50 MG: 25 INJECTION INTRAMUSCULAR; INTRAVENOUS; SUBCUTANEOUS at 04:30

## 2019-03-26 RX ADMIN — MEPERIDINE HYDROCHLORIDE 50 MG: 25 INJECTION INTRAMUSCULAR; INTRAVENOUS; SUBCUTANEOUS at 17:29

## 2019-03-26 RX ADMIN — SODIUM CHLORIDE, POTASSIUM CHLORIDE, SODIUM LACTATE AND CALCIUM CHLORIDE: 600; 310; 30; 20 INJECTION, SOLUTION INTRAVENOUS at 08:53

## 2019-03-26 RX ADMIN — MEROPENEM 1 G: 1 INJECTION, POWDER, FOR SOLUTION INTRAVENOUS at 03:17

## 2019-03-26 RX ADMIN — MEPERIDINE HYDROCHLORIDE 50 MG: 25 INJECTION INTRAMUSCULAR; INTRAVENOUS; SUBCUTANEOUS at 00:25

## 2019-03-26 RX ADMIN — MEPERIDINE HYDROCHLORIDE 50 MG: 25 INJECTION INTRAMUSCULAR; INTRAVENOUS; SUBCUTANEOUS at 08:53

## 2019-03-26 RX ADMIN — MORPHINE SULFATE 2 MG: 2 INJECTION, SOLUTION INTRAMUSCULAR; INTRAVENOUS at 03:17

## 2019-03-26 RX ADMIN — KETOROLAC TROMETHAMINE 15 MG: 30 INJECTION, SOLUTION INTRAMUSCULAR at 11:14

## 2019-03-26 RX ADMIN — MORPHINE SULFATE 2 MG: 2 INJECTION, SOLUTION INTRAMUSCULAR; INTRAVENOUS at 06:30

## 2019-03-26 RX ADMIN — SODIUM CHLORIDE: 9 INJECTION, SOLUTION INTRAVENOUS at 08:10

## 2019-03-26 RX ADMIN — MEROPENEM 1 G: 1 INJECTION, POWDER, FOR SOLUTION INTRAVENOUS at 19:04

## 2019-03-26 RX ADMIN — KETOROLAC TROMETHAMINE 15 MG: 30 INJECTION, SOLUTION INTRAMUSCULAR at 19:05

## 2019-03-26 RX ADMIN — SODIUM CHLORIDE, POTASSIUM CHLORIDE, SODIUM LACTATE AND CALCIUM CHLORIDE: 600; 310; 30; 20 INJECTION, SOLUTION INTRAVENOUS at 16:20

## 2019-03-26 ASSESSMENT — PAIN SCALES - GENERAL
PAINLEVEL_OUTOF10: 5
PAINLEVEL_OUTOF10: 7
PAINLEVEL_OUTOF10: 8
PAINLEVEL_OUTOF10: 5
PAINLEVEL_OUTOF10: 7
PAINLEVEL_OUTOF10: 7
PAINLEVEL_OUTOF10: 6
PAINLEVEL_OUTOF10: 0
PAINLEVEL_OUTOF10: 9
PAINLEVEL_OUTOF10: 5
PAINLEVEL_OUTOF10: 7
PAINLEVEL_OUTOF10: 8
PAINLEVEL_OUTOF10: 0
PAINLEVEL_OUTOF10: 5
PAINLEVEL_OUTOF10: 5
PAINLEVEL_OUTOF10: 8

## 2019-03-26 ASSESSMENT — PAIN DESCRIPTION - LOCATION
LOCATION: ABDOMEN

## 2019-03-26 ASSESSMENT — ENCOUNTER SYMPTOMS
TROUBLE SWALLOWING: 0
COLOR CHANGE: 0
ABDOMINAL PAIN: 1
SINUS PAIN: 0
PHOTOPHOBIA: 0
VOMITING: 0
SHORTNESS OF BREATH: 0
COUGH: 0
CHOKING: 0
DIARRHEA: 0
BACK PAIN: 0
CONSTIPATION: 0
WHEEZING: 0
CHEST TIGHTNESS: 0
NAUSEA: 0
ABDOMINAL DISTENTION: 1
SINUS PRESSURE: 0

## 2019-03-26 ASSESSMENT — PAIN DESCRIPTION - FREQUENCY
FREQUENCY: CONTINUOUS

## 2019-03-26 ASSESSMENT — PAIN DESCRIPTION - PAIN TYPE
TYPE: ACUTE PAIN

## 2019-03-26 ASSESSMENT — PAIN DESCRIPTION - DESCRIPTORS
DESCRIPTORS: ACHING

## 2019-03-26 NOTE — PROGRESS NOTES
2250 - Patient and family requesting stronger/additional pain medications at this time. Patient rates pain 8/10 and states he has not had any pain relief \"for 12-14 hours now\". This RN reviewed current PRN medications with patient and daughter, informed family that on call physician would be notified with request.     2300 - This RN spoke with Eli Douglas NP regarding patient's request. Verbal order for one time dose, 2mg IV Morphine to treat pain. Order verbalized back to NP for clarification. Patient and daughter updated at this time. 2323 - One time morphine dose given, see eMAR.

## 2019-03-26 NOTE — PROGRESS NOTES
Assessment and Plan:        1. Acute on chronic pancreatitis with bacteremia - 3rd epioside since June 2018. Patient states this is his first hospitalization for pancreatitis, others were managed outpatient. Morphine 4 mg, demerol 50 mg once. Lipase >3,000. BISAP score 0. Keep patient NPO, no need for NG if no nausea and vomiting. Demerol 50 mg Q 4 PRN. Added Toradol 15 mg Q 8 Blood cultures growing gram negative rods. Merrem 1 gram Q 8 hours. 2. Hyponatremia- Resolved; LR @ 150 ml   3. Anion gap Metabolic acidosis - Resolving; CO2 22, anion gap 11, fluids LR @ 150 ml/hr, monitor   4. Leukocytosis- Improving;  WBC 19.6, monitor, no fever at this time   5. JEANNE- Improving, creatinine 0.9, BUN 27. Continue 0.9 @ 150 ml/hr.   6. Lactic acidosis- Resolved; 5.5 at [place], repeat 2.5, 1.3 Continue hydration, trend repeat @1700  7. ALT>AST-, , NPO monitor, holding home Zocor. Ultrasound shows fatty liver with no mass. 8. Hyperlipidemia- Home Zocor on hold   9. Hypertension- History, currently normotensive. Holding home lisinopril 10 mg daily.  Will start IV medication if needed for hypertension   10. Hyperglycemia- Improving; sugar 126, 185. AC/HS low dose insulin scale in setting of pancreatitis  11. Cholelithiasis- shown on ultrasound, no signs of obstruction. Normal common bile duct, no wall thickening        Dispo: Transfer to [facility], report called to [facility]    CC:  Pancreatitis   HPI: Norman Alas is a  65 year old white male who presented to [facility] with acute abdominal pain. [facility] has a past medical history of hyperlipidemia and hypertension. [facility] states that he has had pancreatis in June of 2018 and 3 times since then but was not hospitalized for these cases. He states that he had 2 beers yesterday and started having intense abdominal pain. He denies any nausea or vomiting and states he had one diarrhea stool at home.  Prior to this episode of drinking he states that he had not had a drink since the June 2018 episode of pancreatis.  He rates the pain 10/10 and states that nothing improves the pain.  He describes it as sharp and shooting into his belly. 3/26 patient continues to have pain, continue NPO. Review of Systems   Constitutional: Negative for chills, diaphoresis and fatigue. HENT: Negative for congestion, sinus pressure, sinus pain and trouble swallowing. Eyes: Negative for photophobia and visual disturbance. Respiratory: Negative for cough, choking, chest tightness, shortness of breath and wheezing. Cardiovascular: Negative for chest pain and leg swelling. Gastrointestinal: Positive for abdominal distention and abdominal pain. Negative for constipation, diarrhea, nausea and vomiting. Endocrine: Negative for polydipsia, polyphagia and polyuria. Genitourinary: Negative for decreased urine volume, dysuria, flank pain and urgency. Musculoskeletal: Negative for back pain, gait problem and neck pain. Skin: Negative for color change and pallor. Allergic/Immunologic: Negative for environmental allergies and food allergies. Neurological: Negative for seizures, syncope, weakness, light-headedness and headaches. Hematological: Negative for adenopathy. Psychiatric/Behavioral: Negative for confusion. The patient is not nervous/anxious. PMH:  Per HPI  SHX:  Reformed smoker, states quit 30 years ago. Denies drug use.  Quit drinking in June 2018, recently started again, states 2 beers yesterday  FHX: Mother:Stroke Father: no known History  Allergies: NKDA  Medications:     sodium chloride 150 mL/hr at 03/25/19 1633    dextrose        sodium chloride flush  10 mL Intravenous 2 times per day    insulin lispro  0-6 Units Subcutaneous TID WC    insulin lispro  0-3 Units Subcutaneous Nightly    meropenem  1 g Intravenous Q8H       sodium chloride flush 10 mL PRN   ondansetron 4 mg Q6H PRN   magnesium hydroxide 30 mL Daily PRN   glucose 15 g PRN   dextrose 12.5 g PRN   glucagon (rDNA) 1 mg PRN   dextrose 100 mL/hr PRN   meperidine 50 mg Q4H PRN   morphine 2 mg Q2H PRN     Vital Signs: T: 98.9 P: 73 RR: 21 B/P: 142/64: O2 Sat:91: I/O: 2712/1350  CAM-ICU:   negative  General:   Acutely ill appearing white male   HEENT:  normocephalic and atraumatic. No scleral icterus. Neck: supple. No JVD. Lungs: clear to auscultation. No retractions  Cardiac: RRR  Abdomen: firm   tender  Extremities:  No clubbing, cyanosis, or edema x 4. Vasculature: capillary refill < 3 seconds. Skin:  warm and dry. Psych:  Alert and oriented x3. Affect appropriate  Lymph:  No supraclavicular adenopathy. Neurologic:  No focal deficit. Data: (All radiographs, tracings, PFTs, and imaging are personally viewed and interpreted unless otherwise noted). · Telemetry shows sinus rhythm  · Sodium 140, potassium 4.7, Chloride 107, CO2 22, BUN 27, creatinine 0.9, Lactic 1.3, glucose 145, Alk Phos 131, , , BiIlirubin 1.2, Lipase 1,413, WBC 19.6, hemoglobin 14.6, Hematocrit 44.2  · CT Abdomen pelvis acute uncomplicated pancreatitis   · EKG shows Normal sinus rhythm      Case and plan discussed with Dr. Sandra Glasgow        patient seen and examined by me. I discussed with the patient the issues surrounding his pancreatitis. I explained that his pancreas was markedly inflamed and was at risk for auto digestion and at risk for developing spontaneous hemorrhage. I reiterated the need to stay absolutely nothing by mouth until pain has resolved. Pain control is limited by his liver dysfunction. Etiology of pancreatitis is alcohol binge drinking. Patient does have gallbladder disease but no evidence of acute cholecystitis or obstructive cholangitis. Electronically signed by Veronica Glasgow MD.

## 2019-03-26 NOTE — PLAN OF CARE
Problem: Discharge Planning:  Goal: Participates in care planning  Description  Participates in care planning  Outcome: Ongoing  Note:   Planning to return home discharge. Patient and family active in discharge planning. Problem: Pain:  Goal: Control of acute pain  Description  Control of acute pain  3/25/2019 2145 by Miguelito Kidd RN  Outcome: Ongoing  Note:   High pain levels this shift. Morphine added to treat breakthrough pain. Pain Assessment: 0-10  Pain Level: 8   Pain goal:  6  Is pain goal met at this time? No     Additional interventions to be implemented: medications Demerol and morphine, position change and rest         Problem: Falls - Risk of:  Goal: Will remain free from falls  Description  Will remain free from falls  Outcome: Ongoing  Note:   No falls this shift. Call light in reach and used appropriately. Bed alarm remains on. Patient remains on bedrest with bathroom privileges. Problem: Risk for Impaired Skin Integrity  Goal: Tissue integrity - skin and mucous membranes  Description  Structural intactness and normal physiological function of skin and  mucous membranes. Outcome: Ongoing  Note:   No new skin breakdown this shift. Patient is assisted with turning every two hours and as needed. Care plan reviewed with patient. Patient verbalize understanding of the plan of care and contribute to goal setting.

## 2019-03-26 NOTE — PLAN OF CARE
Problem: Discharge Planning:  Goal: Participates in care planning  Description  Participates in care planning  Outcome: Ongoing  Note:   Patient planning on going home with family     Problem: Anxiety/Stress:  Goal: Level of anxiety will decrease  Description  Level of anxiety will decrease  Outcome: Ongoing  Note:   Patient calm at this time. Resting nicely. Problem: Pain:  Goal: Pain level will decrease  Description  Pain level will decrease  Outcome: Ongoing  Note:   Pt report pain at 8 on scale. Pt states oral/iv medication helping to achieve pain goal of a 6 on scale. Problem: Falls - Risk of:  Goal: Will remain free from falls  Description  Will remain free from falls  Outcome: Ongoing  Note:   Pt using call light appropriately to call for assistance with ambulation to the bathroom and to chair. Pt is also compliant with use of non-skid slippers. Pt reports understanding of fall prevention when discussed. Problem: Risk for Impaired Skin Integrity  Goal: Tissue integrity - skin and mucous membranes  Description  Structural intactness and normal physiological function of skin and  mucous membranes. Outcome: Ongoing  Note:   No skin breakdown noted. Patient turns and repositions self in bed. Care plan reviewed with patient. Patient verbalize understanding of the plan of care and contribute to goal setting.

## 2019-03-26 NOTE — CARE COORDINATION
3/26/19, 3:16 PM      [place] day: 1  Location: [facility] Reason for admit: Pancreatitis, unspecified pancreatitis type [K85.90]   Procedure:   3/25 CT Abd/pelvis: Findings consistent with acute uncomplicated pancreatitis  3/25 US GB RUQ/Pancreas/Liver: Hepatomegaly with fatty liver changes; cholelithiasis  3/25 Renal US:   1. Small bilateral nonobstructing renal cystic changes. 2. Mild elevation of renal artery resistive index values, correlate with changes related to renal artery stenosis   3. Moderate postvoid residual volume of 98 mL. Treatment Plan of Care: Remains NPO. Blood cultures + gram negative rods - IV merrem started. Afebrile. Sats 93% on 2L O2. Telemetry, I&O, daily weight, SCDs. IVF, SSI ACHS, IV toradol 15 mg Q8H, prn IV demerol, IV merrem, prn zofran. Creat down to 0.9. Alb 3.2, alk phos down to 131, alt down to 377, amylase 1162, ast down to 227, lipase down to 1413.2, wbc 19.6. PCP: Kvng Garcia MD  Readmission Risk Score: 9%  Discharge Plan: Home with wife. Denies needs, declines HH.

## 2019-03-27 LAB
ALBUMIN SERPL-MCNC: 3 G/DL (ref 3.5–5.1)
ALBUMIN SERPL-MCNC: 3.1 G/DL (ref 3.5–5.1)
ALP BLD-CCNC: 110 U/L (ref 38–126)
ALP BLD-CCNC: 127 U/L (ref 38–126)
ALT SERPL-CCNC: 181 U/L (ref 11–66)
ALT SERPL-CCNC: 235 U/L (ref 11–66)
AMYLASE: 607 U/L (ref 20–104)
ANION GAP SERPL CALCULATED.3IONS-SCNC: 10 MEQ/L (ref 8–16)
ANION GAP SERPL CALCULATED.3IONS-SCNC: 11 MEQ/L (ref 8–16)
AST SERPL-CCNC: 61 U/L (ref 5–40)
AST SERPL-CCNC: 81 U/L (ref 5–40)
BILIRUB SERPL-MCNC: 0.9 MG/DL (ref 0.3–1.2)
BILIRUB SERPL-MCNC: 1.1 MG/DL (ref 0.3–1.2)
BILIRUBIN DIRECT: 0.4 MG/DL (ref 0–0.3)
BUN BLDV-MCNC: 25 MG/DL (ref 7–22)
BUN BLDV-MCNC: 26 MG/DL (ref 7–22)
CALCIUM SERPL-MCNC: 7.9 MG/DL (ref 8.5–10.5)
CALCIUM SERPL-MCNC: 8.2 MG/DL (ref 8.5–10.5)
CHLORIDE BLD-SCNC: 105 MEQ/L (ref 98–111)
CHLORIDE BLD-SCNC: 106 MEQ/L (ref 98–111)
CO2: 25 MEQ/L (ref 23–33)
CO2: 27 MEQ/L (ref 23–33)
CREAT SERPL-MCNC: 0.8 MG/DL (ref 0.4–1.2)
CREAT SERPL-MCNC: 0.9 MG/DL (ref 0.4–1.2)
CULTURE: ABNORMAL
EKG ATRIAL RATE: 69 BPM
EKG P AXIS: 59 DEGREES
EKG P-R INTERVAL: 164 MS
EKG Q-T INTERVAL: 392 MS
EKG QRS DURATION: 92 MS
EKG QTC CALCULATION (BAZETT): 420 MS
EKG R AXIS: 32 DEGREES
EKG T AXIS: 23 DEGREES
EKG VENTRICULAR RATE: 69 BPM
ERYTHROCYTE [DISTWIDTH] IN BLOOD BY AUTOMATED COUNT: 15.7 % (ref 11.5–14.5)
ERYTHROCYTE [DISTWIDTH] IN BLOOD BY AUTOMATED COUNT: 52.7 FL (ref 35–45)
GFR SERPL CREATININE-BSD FRML MDRD: 83 ML/MIN/1.73M2
GFR SERPL CREATININE-BSD FRML MDRD: > 90 ML/MIN/1.73M2
GLUCOSE BLD-MCNC: 122 MG/DL (ref 70–108)
GLUCOSE BLD-MCNC: 122 MG/DL (ref 70–108)
GLUCOSE BLD-MCNC: 124 MG/DL (ref 70–108)
GLUCOSE BLD-MCNC: 135 MG/DL (ref 70–108)
GLUCOSE BLD-MCNC: 216 MG/DL (ref 70–108)
HCT VFR BLD CALC: 42.9 % (ref 42–52)
HEMOGLOBIN: 14.1 GM/DL (ref 14–18)
LIPASE: 597.1 U/L (ref 5.6–51.3)
Lab: ABNORMAL
Lab: ABNORMAL
MCH RBC QN AUTO: 30.3 PG (ref 26–33)
MCHC RBC AUTO-ENTMCNC: 32.9 GM/DL (ref 32.2–35.5)
MCV RBC AUTO: 92.3 FL (ref 80–94)
MRSA SCREEN: NORMAL
PHOSPHORUS: 1.5 MG/DL (ref 2.4–4.7)
PLATELET # BLD: 155 THOU/MM3 (ref 130–400)
PMV BLD AUTO: 11 FL (ref 9.4–12.4)
POTASSIUM REFLEX MAGNESIUM: 4.7 MEQ/L (ref 3.5–5.2)
POTASSIUM SERPL-SCNC: 4.5 MEQ/L (ref 3.5–5.2)
RBC # BLD: 4.65 MILL/MM3 (ref 4.7–6.1)
SODIUM BLD-SCNC: 141 MEQ/L (ref 135–145)
SODIUM BLD-SCNC: 143 MEQ/L (ref 135–145)
SPECIMEN DESCRIPTION: ABNORMAL
SPECIMEN DESCRIPTION: ABNORMAL
TOTAL PROTEIN: 6.3 G/DL (ref 6.1–8)
TOTAL PROTEIN: 6.8 G/DL (ref 6.1–8)
WBC # BLD: 19.8 THOU/MM3 (ref 4.8–10.8)

## 2019-03-27 PROCEDURE — 80076 HEPATIC FUNCTION PANEL: CPT

## 2019-03-27 PROCEDURE — 36415 COLL VENOUS BLD VENIPUNCTURE: CPT

## 2019-03-27 PROCEDURE — 6370000000 HC RX 637 (ALT 250 FOR IP): Performed by: NURSE PRACTITIONER

## 2019-03-27 PROCEDURE — 6360000002 HC RX W HCPCS: Performed by: NURSE PRACTITIONER

## 2019-03-27 PROCEDURE — 84100 ASSAY OF PHOSPHORUS: CPT

## 2019-03-27 PROCEDURE — 80053 COMPREHEN METABOLIC PANEL: CPT

## 2019-03-27 PROCEDURE — 82948 REAGENT STRIP/BLOOD GLUCOSE: CPT

## 2019-03-27 PROCEDURE — 85027 COMPLETE CBC AUTOMATED: CPT

## 2019-03-27 PROCEDURE — 2060000000 HC ICU INTERMEDIATE R&B

## 2019-03-27 PROCEDURE — 2580000003 HC RX 258: Performed by: NURSE PRACTITIONER

## 2019-03-27 PROCEDURE — 83690 ASSAY OF LIPASE: CPT

## 2019-03-27 PROCEDURE — 99232 SBSQ HOSP IP/OBS MODERATE 35: CPT | Performed by: FAMILY MEDICINE

## 2019-03-27 PROCEDURE — 87040 BLOOD CULTURE FOR BACTERIA: CPT

## 2019-03-27 PROCEDURE — 2709999900 HC NON-CHARGEABLE SUPPLY

## 2019-03-27 PROCEDURE — 82150 ASSAY OF AMYLASE: CPT

## 2019-03-27 RX ADMIN — SODIUM CHLORIDE, POTASSIUM CHLORIDE, SODIUM LACTATE AND CALCIUM CHLORIDE: 600; 310; 30; 20 INJECTION, SOLUTION INTRAVENOUS at 03:08

## 2019-03-27 RX ADMIN — INSULIN LISPRO 2 UNITS: 100 INJECTION, SOLUTION INTRAVENOUS; SUBCUTANEOUS at 17:32

## 2019-03-27 RX ADMIN — MEROPENEM 1 G: 1 INJECTION, POWDER, FOR SOLUTION INTRAVENOUS at 03:14

## 2019-03-27 RX ADMIN — SODIUM CHLORIDE, POTASSIUM CHLORIDE, SODIUM LACTATE AND CALCIUM CHLORIDE: 600; 310; 30; 20 INJECTION, SOLUTION INTRAVENOUS at 17:37

## 2019-03-27 RX ADMIN — KETOROLAC TROMETHAMINE 15 MG: 30 INJECTION, SOLUTION INTRAMUSCULAR at 10:12

## 2019-03-27 RX ADMIN — SODIUM CHLORIDE, POTASSIUM CHLORIDE, SODIUM LACTATE AND CALCIUM CHLORIDE: 600; 310; 30; 20 INJECTION, SOLUTION INTRAVENOUS at 10:11

## 2019-03-27 RX ADMIN — KETOROLAC TROMETHAMINE 15 MG: 30 INJECTION, SOLUTION INTRAMUSCULAR at 17:32

## 2019-03-27 RX ADMIN — KETOROLAC TROMETHAMINE 15 MG: 30 INJECTION, SOLUTION INTRAMUSCULAR at 02:00

## 2019-03-27 RX ADMIN — MEROPENEM 1 G: 1 INJECTION, POWDER, FOR SOLUTION INTRAVENOUS at 19:15

## 2019-03-27 RX ADMIN — MEROPENEM 1 G: 1 INJECTION, POWDER, FOR SOLUTION INTRAVENOUS at 12:33

## 2019-03-27 ASSESSMENT — PAIN SCALES - GENERAL
PAINLEVEL_OUTOF10: 0
PAINLEVEL_OUTOF10: 5
PAINLEVEL_OUTOF10: 0
PAINLEVEL_OUTOF10: 4
PAINLEVEL_OUTOF10: 4
PAINLEVEL_OUTOF10: 0
PAINLEVEL_OUTOF10: 4

## 2019-03-27 NOTE — PROGRESS NOTES
Hospitalist Progress Note    Patient:  Ruperto Gregg      Unit/Bed:4B-12/012-A    YOB: 1945    MRN: [id number]       Acct: [de-identified]     PCP: Char Reed MD    Date of Admission: 3/25/2019    Assessment/Plan:  1) Acute on Chronic Pancreatitis with bacteremia  - Continue with demerol PRN, toradol PRN  - BC: 3/25 - gram negative rods  - Lipase 597  - Continue with meropenum for now  - Repeat BC for clearance   - Advance to clears today    2) Hypometria  - Resolved with IVF hydration    3) JEANNE  - Improved with IVF hydration    4) HLD  - Zocor on hold 2/2 to #1    5) Hyperglycemia  - Continue with SSI    Expected discharge date: 1-2 days    Disposition:    [x] Home       [] TCU       [] Rehab       [] Psych       [] SNF       [] [facility]       [] Other-    Chief Complaint: F/U pancreatitis    Subjective (past 24 hours): Pt seen and examined bedside. Pt doing better. Abdominal pain is better. Medications:  Reviewed    Infusion Medications    lactated ringers 150 mL/hr at 03/27/19 1011    dextrose       Scheduled Medications    ketorolac  15 mg Intravenous Q8H    sodium chloride flush  10 mL Intravenous 2 times per day    insulin lispro  0-6 Units Subcutaneous TID WC    insulin lispro  0-3 Units Subcutaneous Nightly    meropenem  1 g Intravenous Q8H     PRN Meds: aspirin, hydrALAZINE, sodium chloride flush, ondansetron, magnesium hydroxide, glucose, dextrose, glucagon (rDNA), dextrose, meperidine      Intake/Output Summary (Last 24 hours) at 3/27/2019 1413  Last data filed at 3/27/2019 0511  Gross per 24 hour   Intake 3331 ml   Output 350 ml   Net 2981 ml       Diet:  Diet NPO Effective Now    Exam:  BP (!) 149/69   Pulse 71   Temp 98 °F (36.7 °C) (Oral)   Resp 16   Wt 229 lb 4.8 oz (104 kg)   SpO2 93%     General appearance: No apparent distress, appears stated age and cooperative. HEENT: Pupils equal, round, and reactive to light.  Conjunctivae/corneas clear.  Neck: Supple, with full range of motion. No jugular venous distention. Trachea midline. Respiratory:  Normal respiratory effort. Clear to auscultation, bilaterally without Rales/Wheezes/Rhonchi. Cardiovascular: Regular rate and rhythm with normal S1/S2 without murmurs, rubs or gallops. Abdomen: Soft, non-tender, non-distended with normal bowel sounds. Musculoskeletal: passive and active ROM x 4 extremities. Skin: Skin color, texture, turgor normal.  No rashes or lesions. Neurologic:  Neurovascularly intact without any focal sensory/motor deficits. Cranial nerves: II-XII intact, grossly non-focal.  Psychiatric: Alert and oriented, thought content appropriate, normal insight  Peripheral Pulses: +2 palpable, equal bilaterally     Labs:   Recent Labs     03/25/19  1030 03/26/19  0357 03/27/19  0449   WBC 26.2* 19.6* 19.8*   HGB 15.3 14.6 14.1   HCT 44.1 44.2 42.9    185 155     Recent Labs     03/25/19  1030 03/26/19  0357 03/27/19  0449    140 141   K 4.8 4.7 4.7    107 106   CO2 17* 22* 25   BUN 28* 27* 25*   CREATININE 1.4* 0.9 0.9   CALCIUM 8.6 7.8* 7.9*   PHOS  --  2.5 1.5*     Recent Labs     03/25/19  1030 03/26/19  0357 03/27/19  0449   * 227* 81*   * 377* 235*   BILIDIR 2.3* 0.5* 0.4*   BILITOT 3.1* 1.2 0.9   ALKPHOS 165* 131* 110     No results for input(s): INR in the last 72 hours. No results for input(s): [place]cie [place] in the last 72 hours. Microbiology:      Urinalysis:    No results found for: [place], BACTERIA, RBCUA, BLOODU, [address], [place] [address]    Radiology:  US RENAL COMPLETE   Final Result       1. Small bilateral nonobstructing renal cystic changes. 2. Mild elevation of renal artery resistive index values, correlate with changes related to renal artery stenosis   3. Moderate postvoid residual volume of 98 mL. **This report has been created using voice recognition software.  It may contain minor errors which are inherent in voice recognition technology. **      Final report electronically signed by Dr. Becca Cotto on 3/26/2019 3:34 AM      [address]   Final Result   1. Hepatomegaly with fatty liver changes. Upper   2. Cholelithiasis. 3. Unremarkable pancreas. **This report has been created using voice recognition software. It may contain minor errors which are inherent in voice recognition technology. **      Final report electronically signed by Dr. Becca Cotto on 3/26/2019 3:47 AM      US PANCREAS   Final Result   1. Hepatomegaly with fatty liver changes. Upper   2. Cholelithiasis. 3. Unremarkable pancreas. **This report has been created using voice recognition software. It may contain minor errors which are inherent in voice recognition technology. **      Final report electronically signed by Dr. Becca Cotto on 3/26/2019 3:47 AM      US LIVER   Final Result   1. Hepatomegaly with fatty liver changes. Upper   2. Cholelithiasis. 3. Unremarkable pancreas. **This report has been created using voice recognition software. It may contain minor errors which are inherent in voice recognition technology. **      Final report electronically signed by Dr. Becca Cotto on 3/26/2019 3:47 AM        DVT prophylaxis: [] Lovenox                                 [x] SCDs                                 [] SQ Heparin                                 [] Encourage ambulation           [] Already on Anticoagulation     Code Status: Full Code    PT/OT Eval Status:     Tele:   [] yes             [] no    Active Hospital Problems    Diagnosis Date Noted    Pancreatitis, unspecified pancreatitis type [K85.90] 03/25/2019    Hyperlipidemia [E78.5]     Essential hypertension [I10]     Hyponatremia [E87.1]        Electronically signed by Edna Sue MD on 3/27/2019 at 2:13 PM

## 2019-03-27 NOTE — PLAN OF CARE
Problem: Discharge Planning:  Goal: Participates in care planning  Description  Participates in care planning  3/26/2019 2205 by Talita Vincent RN  Outcome: Ongoing  Note:   Patient plans to return home to private residence when medically stable. Will continue to assess for additional needs. Problem: Anxiety/Stress:  Goal: Level of anxiety will decrease  Description  Level of anxiety will decrease  3/26/2019 2205 by Talita Vincent RN  Outcome: Ongoing  Note:   Patient level of anxiety and stress maintained throughout shift. Calm environment inforced. Problem: Pain:  Description  Pain management should include both nonpharmacologic and pharmacologic interventions. Goal: Control of acute pain  Description  Control of acute pain  Outcome: Ongoing  Note:   Patient complains of pain in his left lower quadrant of his abdomen. Patients current pain level is a 5/10. Patients pain goal is a 5/10. Patient is receiving toradol and demerol for pain at this time. Position to supine for comfort. Will continue to assess with hourly rounding. Problem: Falls - Risk of:  Goal: Will remain free from falls  Description  Will remain free from falls  3/26/2019 2205 by Talita Vincent RN  Outcome: Ongoing  Note:   Patient alert and oriented x4. Bed alarmed armed. Bed wheels locked. Bedside table in reach. Patient up with assistance when ambulating. Patient verbalizes and demonstrates the use of the call light. Hourly rounding being completed. Problem: Risk for Impaired Skin Integrity  Goal: Tissue integrity - skin and mucous membranes  Description  Structural intactness and normal physiological function of skin and  mucous membranes. 3/26/2019 2205 by Talita Vincent RN  Outcome: Ongoing  Note:   Patient turns self independently. Patient taught to change position frequently to prevent breakdown. No redness noted on pressure points such as elbows, back of head, heels, shoulder blades, or coccyx at this time.  Will continue to monitor. Care plan reviewed with patient. Patient verbalizes understanding of the plan of care and contributes to goal setting.

## 2019-03-28 LAB
ALBUMIN SERPL-MCNC: 2.6 G/DL (ref 3.5–5.1)
ALP BLD-CCNC: 113 U/L (ref 38–126)
ALT SERPL-CCNC: 129 U/L (ref 11–66)
AMYLASE: 257 U/L (ref 20–104)
ANION GAP SERPL CALCULATED.3IONS-SCNC: 11 MEQ/L (ref 8–16)
AST SERPL-CCNC: 37 U/L (ref 5–40)
BACTERIA: ABNORMAL
BILIRUB SERPL-MCNC: 0.9 MG/DL (ref 0.3–1.2)
BILIRUBIN DIRECT: 0.3 MG/DL (ref 0–0.3)
BILIRUBIN URINE: NEGATIVE
BLOOD, URINE: ABNORMAL
BUN BLDV-MCNC: 23 MG/DL (ref 7–22)
CALCIUM SERPL-MCNC: 7.9 MG/DL (ref 8.5–10.5)
CASTS: ABNORMAL /LPF
CASTS: ABNORMAL /LPF
CHARACTER, URINE: ABNORMAL
CHLORIDE BLD-SCNC: 105 MEQ/L (ref 98–111)
CO2: 24 MEQ/L (ref 23–33)
COLOR: ABNORMAL
CREAT SERPL-MCNC: 0.7 MG/DL (ref 0.4–1.2)
CRYSTALS: ABNORMAL
EPITHELIAL CELLS, UA: ABNORMAL /HPF
ERYTHROCYTE [DISTWIDTH] IN BLOOD BY AUTOMATED COUNT: 15.6 % (ref 11.5–14.5)
ERYTHROCYTE [DISTWIDTH] IN BLOOD BY AUTOMATED COUNT: 53.2 FL (ref 35–45)
GFR SERPL CREATININE-BSD FRML MDRD: > 90 ML/MIN/1.73M2
GLUCOSE BLD-MCNC: 122 MG/DL (ref 70–108)
GLUCOSE BLD-MCNC: 128 MG/DL (ref 70–108)
GLUCOSE BLD-MCNC: 139 MG/DL (ref 70–108)
GLUCOSE BLD-MCNC: 142 MG/DL (ref 70–108)
GLUCOSE BLD-MCNC: 154 MG/DL (ref 70–108)
GLUCOSE BLD-MCNC: 171 MG/DL (ref 70–108)
GLUCOSE, URINE: 250 MG/DL
HCT VFR BLD CALC: 40.9 % (ref 42–52)
HEMOGLOBIN: 13.2 GM/DL (ref 14–18)
KETONES, URINE: ABNORMAL
LEUKOCYTE EST, POC: NEGATIVE
LIPASE: 194.9 U/L (ref 5.6–51.3)
LIPASE: 237.7 U/L (ref 5.6–51.3)
MCH RBC QN AUTO: 30.1 PG (ref 26–33)
MCHC RBC AUTO-ENTMCNC: 32.3 GM/DL (ref 32.2–35.5)
MCV RBC AUTO: 93.2 FL (ref 80–94)
MISCELLANEOUS LAB TEST RESULT: ABNORMAL
NITRITE, URINE: NEGATIVE
PH UA: 6 (ref 5–9)
PHOSPHORUS: 1 MG/DL (ref 2.4–4.7)
PLATELET # BLD: 154 THOU/MM3 (ref 130–400)
PMV BLD AUTO: 10.7 FL (ref 9.4–12.4)
POTASSIUM REFLEX MAGNESIUM: 4 MEQ/L (ref 3.5–5.2)
PROTEIN UA: 100 MG/DL
RBC # BLD: 4.39 MILL/MM3 (ref 4.7–6.1)
RBC URINE: ABNORMAL /HPF
RENAL EPITHELIAL, UA: ABNORMAL
SODIUM BLD-SCNC: 140 MEQ/L (ref 135–145)
SPECIFIC GRAVITY UA: 1.03 (ref 1–1.03)
TOTAL PROTEIN: 6.1 G/DL (ref 6.1–8)
UROBILINOGEN, URINE: 0.2 EU/DL (ref 0–1)
WBC # BLD: 17.3 THOU/MM3 (ref 4.8–10.8)
WBC UA: ABNORMAL /HPF
YEAST: ABNORMAL

## 2019-03-28 PROCEDURE — 6360000002 HC RX W HCPCS: Performed by: FAMILY MEDICINE

## 2019-03-28 PROCEDURE — 85027 COMPLETE CBC AUTOMATED: CPT

## 2019-03-28 PROCEDURE — 84100 ASSAY OF PHOSPHORUS: CPT

## 2019-03-28 PROCEDURE — 81001 URINALYSIS AUTO W/SCOPE: CPT

## 2019-03-28 PROCEDURE — 82150 ASSAY OF AMYLASE: CPT

## 2019-03-28 PROCEDURE — 36415 COLL VENOUS BLD VENIPUNCTURE: CPT

## 2019-03-28 PROCEDURE — 83690 ASSAY OF LIPASE: CPT

## 2019-03-28 PROCEDURE — 2060000000 HC ICU INTERMEDIATE R&B

## 2019-03-28 PROCEDURE — 2500000003 HC RX 250 WO HCPCS: Performed by: FAMILY MEDICINE

## 2019-03-28 PROCEDURE — 2580000003 HC RX 258: Performed by: NURSE PRACTITIONER

## 2019-03-28 PROCEDURE — 2580000003 HC RX 258: Performed by: FAMILY MEDICINE

## 2019-03-28 PROCEDURE — 6360000002 HC RX W HCPCS: Performed by: NURSE PRACTITIONER

## 2019-03-28 PROCEDURE — 80076 HEPATIC FUNCTION PANEL: CPT

## 2019-03-28 PROCEDURE — 82948 REAGENT STRIP/BLOOD GLUCOSE: CPT

## 2019-03-28 PROCEDURE — 2709999900 HC NON-CHARGEABLE SUPPLY

## 2019-03-28 PROCEDURE — 99232 SBSQ HOSP IP/OBS MODERATE 35: CPT | Performed by: FAMILY MEDICINE

## 2019-03-28 PROCEDURE — 80053 COMPREHEN METABOLIC PANEL: CPT

## 2019-03-28 RX ORDER — CIPROFLOXACIN 2 MG/ML
400 INJECTION, SOLUTION INTRAVENOUS EVERY 12 HOURS
Status: DISCONTINUED | OUTPATIENT
Start: 2019-03-28 | End: 2019-03-29 | Stop reason: HOSPADM

## 2019-03-28 RX ADMIN — SODIUM CHLORIDE, POTASSIUM CHLORIDE, SODIUM LACTATE AND CALCIUM CHLORIDE: 600; 310; 30; 20 INJECTION, SOLUTION INTRAVENOUS at 23:37

## 2019-03-28 RX ADMIN — CIPROFLOXACIN 400 MG: 2 INJECTION, SOLUTION INTRAVENOUS at 15:21

## 2019-03-28 RX ADMIN — SODIUM CHLORIDE, POTASSIUM CHLORIDE, SODIUM LACTATE AND CALCIUM CHLORIDE: 600; 310; 30; 20 INJECTION, SOLUTION INTRAVENOUS at 16:42

## 2019-03-28 RX ADMIN — MEROPENEM 1 G: 1 INJECTION, POWDER, FOR SOLUTION INTRAVENOUS at 03:31

## 2019-03-28 RX ADMIN — SODIUM PHOSPHATE, MONOBASIC, MONOHYDRATE 26 MMOL: 276; 142 INJECTION, SOLUTION INTRAVENOUS at 09:44

## 2019-03-28 RX ADMIN — SODIUM CHLORIDE, POTASSIUM CHLORIDE, SODIUM LACTATE AND CALCIUM CHLORIDE: 600; 310; 30; 20 INJECTION, SOLUTION INTRAVENOUS at 02:12

## 2019-03-28 ASSESSMENT — PAIN SCALES - GENERAL
PAINLEVEL_OUTOF10: 0

## 2019-03-28 NOTE — PLAN OF CARE
Problem: Discharge Planning:  Goal: Participates in care planning  Description  Participates in care planning  Outcome: Ongoing  Note:   From home where he plans to return upon discharge when medically ready     Problem: Anxiety/Stress:  Goal: Level of anxiety will decrease  Description  Level of anxiety will decrease  Outcome: Ongoing  Note:   Patient calm and free of anxiety this shift     Problem: Pain:  Goal: Pain level will decrease  Description  Pain level will decrease  Outcome: Ongoing  Note:   Denies pain so far this shift, will continue to assess      Problem: Falls - Risk of:  Goal: Will remain free from falls  Description  Will remain free from falls  Outcome: Ongoing  Note:   Patient up with assist , call light in reach and non skid footwear in use. No falls so far this shift     Problem: Risk for Impaired Skin Integrity  Goal: Tissue integrity - skin and mucous membranes  Description  Structural intactness and normal physiological function of skin and  mucous membranes. Outcome: Ongoing  Note:   No new skin breakdown this shift     Care plan reviewed with patient. Patient verbalizes understanding of the plan of care and contribute to goal setting.

## 2019-03-28 NOTE — PROGRESS NOTES
Hospitalist Progress Note    Patient:  Love Castillo      Unit/Bed:4B-12/012-A    YOB: 1945    MRN: [id number]       Acct: [de-identified]     PCP: Evan Teixeira MD    Date of Admission: 3/25/2019    Assessment/Plan:  1) Acute on Chronic Pancreatitis with Klebsiella bacteremia  - Continue with demerol PRN, toradol PRN  - BC: 3/25 - gram negative rods  - Lipase 200s  - D/C ana m and start cipro (3/28) given his sensitivities  - Repeat BC for clearance (3/27) NTD  - Advance to fulls   - Check UA  - ID consulted as no source of infection identified     2) Hypometria  - Resolved with IVF hydration    3) JEANNE  - Improved with IVF hydration    4) HLD  - Zocor on hold 2/2 to #1    5) Hyperglycemia  - Continue with SSI    Expected discharge date: 1-2 days    Disposition:    [x] Home       [] TCU       [] Rehab       [] Psych       [] SNF       [] [facility]       [] Other-    Chief Complaint: F/U pancreatitis    Subjective (past 24 hours): Pt seen and examined bedside. Pt doing better. Abdominal pain is better.       Medications:  Reviewed    Infusion Medications    lactated ringers 150 mL/hr at 03/28/19 0212    dextrose       Scheduled Medications    phosphorus replacement protocol   Other RX Placeholder    sodium phosphate IVPB  26 mmol Intravenous Once    ciprofloxacin  400 mg Intravenous Q12H    ketorolac  15 mg Intravenous Q8H    sodium chloride flush  10 mL Intravenous 2 times per day    insulin lispro  0-6 Units Subcutaneous TID WC    insulin lispro  0-3 Units Subcutaneous Nightly     PRN Meds: aspirin, hydrALAZINE, sodium chloride flush, ondansetron, magnesium hydroxide, glucose, dextrose, glucagon (rDNA), dextrose, meperidine      Intake/Output Summary (Last 24 hours) at 3/28/2019 1214  Last data filed at 3/28/2019 0329  Gross per 24 hour   Intake 3625.88 ml   Output 75 ml   Net 3550.88 ml       Diet:  DIET CLEAR LIQUID;    Exam:  BP (!) 147/71   Pulse 68   Temp 98.2 °F (36.8 °C) (Oral)   Resp 18   Wt 234 lb 6.4 oz (106.3 kg)   SpO2 94%     General appearance: No apparent distress, appears stated age and cooperative. HEENT: Pupils equal, round, and reactive to light. Conjunctivae/corneas clear. Neck: Supple, with full range of motion. No jugular venous distention. Trachea midline. Respiratory:  Normal respiratory effort. Clear to auscultation, bilaterally without Rales/Wheezes/Rhonchi. Cardiovascular: Regular rate and rhythm with normal S1/S2 without murmurs, rubs or gallops. Abdomen: Soft, non-tender, non-distended with normal bowel sounds. Musculoskeletal: passive and active ROM x 4 extremities. Skin: Skin color, texture, turgor normal.  No rashes or lesions. Neurologic:  Neurovascularly intact without any focal sensory/motor deficits. Cranial nerves: II-XII intact, grossly non-focal.  Psychiatric: Alert and oriented, thought content appropriate, normal insight  Peripheral Pulses: +2 palpable, equal bilaterally     Labs:   Recent Labs     03/26/19 0357 03/27/19 0449 03/28/19  0321   WBC 19.6* 19.8* 17.3*   HGB 14.6 14.1 13.2*   HCT 44.2 42.9 40.9*    155 154     Recent Labs     03/26/19 0357 03/27/19 0449 03/27/19  1445 03/28/19  0321    141 143 140   K 4.7 4.7 4.5 4.0    106 105 105   CO2 22* 25 27 24   BUN 27* 25* 26* 23*   CREATININE 0.9 0.9 0.8 0.7   CALCIUM 7.8* 7.9* 8.2* 7.9*   PHOS 2.5 1.5*  --  1.0*     Recent Labs     03/26/19 0357 03/27/19 0449 03/27/19  1445 03/28/19  0321   * 81* 61* 37   * 235* 181* 129*   BILIDIR 0.5* 0.4*  --  0.3   BILITOT 1.2 0.9 1.1 0.9   ALKPHOS 131* 110 127* 113     No results for input(s): INR in the last 72 hours. No results for input(s): Dareen [place] in the last 72 hours. Microbiology:      Urinalysis:    No results found for: Josette Favor, BACTERIA, RBCUA, BLOODU, [address], [place]    Radiology:  US RENAL COMPLETE   Final Result       1.  Small bilateral nonobstructing renal cystic changes. 2. Mild elevation of renal artery resistive index values, correlate with changes related to renal artery stenosis   3. Moderate postvoid residual volume of 98 mL. **This report has been created using voice recognition software. It may contain minor errors which are inherent in voice recognition technology. **      Final report electronically signed by Dr. Gracie Cage on 3/26/2019 3:34 AM      [address]   Final Result   1. Hepatomegaly with fatty liver changes. Upper   2. Cholelithiasis. 3. Unremarkable pancreas. **This report has been created using voice recognition software. It may contain minor errors which are inherent in voice recognition technology. **      Final report electronically signed by Dr. Gracie Cage on 3/26/2019 3:47 AM      US PANCREAS   Final Result   1. Hepatomegaly with fatty liver changes. Upper   2. Cholelithiasis. 3. Unremarkable pancreas. **This report has been created using voice recognition software. It may contain minor errors which are inherent in voice recognition technology. **      Final report electronically signed by Dr. Gracie Cage on 3/26/2019 3:47 AM      US LIVER   Final Result   1. Hepatomegaly with fatty liver changes. Upper   2. Cholelithiasis. 3. Unremarkable pancreas. **This report has been created using voice recognition software. It may contain minor errors which are inherent in voice recognition technology. **      Final report electronically signed by Dr. Gracie Cage on 3/26/2019 3:47 AM        DVT prophylaxis: [] Lovenox                                 [x] SCDs                                 [] SQ Heparin                                 [] Encourage ambulation           [] Already on Anticoagulation     Code Status: Full Code    PT/OT Eval Status:     Tele:   [] yes             [] no    Active Hospital Problems    Diagnosis Date Noted    Pancreatitis, unspecified pancreatitis type [K85.90] 03/25/2019    Hyperlipidemia [E78.5]     Essential hypertension [I10]     Hyponatremia [E87.1]        Electronically signed by Alida Alcala MD on 3/28/2019 at 12:14 PM

## 2019-03-28 NOTE — CONSULTS
hyperlipidemia   INFECTION CONTROL  AND ANTIMICROBIAL STEWARDSHIP RECOMMENDATION:   · Continue current IV Cipro will transition him to oral Cipro on discharge  · Standard precautions   COORDINATION OF OUT PATIENT CARE   · Discharge home with oral antibiotics. Thank you Jazmyn Arciniega MD for allowing me to participate in this patient's care.     Byron Husain MD,FACP 3/28/2019 6:21 PM

## 2019-03-29 VITALS
TEMPERATURE: 98.9 F | RESPIRATION RATE: 18 BRPM | DIASTOLIC BLOOD PRESSURE: 81 MMHG | SYSTOLIC BLOOD PRESSURE: 177 MMHG | OXYGEN SATURATION: 92 % | HEART RATE: 78 BPM | WEIGHT: 234.4 LBS

## 2019-03-29 LAB
ALBUMIN SERPL-MCNC: 2.4 G/DL (ref 3.5–5.1)
ALP BLD-CCNC: 101 U/L (ref 38–126)
ALT SERPL-CCNC: 80 U/L (ref 11–66)
AMYLASE: 155 U/L (ref 20–104)
ANION GAP SERPL CALCULATED.3IONS-SCNC: 10 MEQ/L (ref 8–16)
AST SERPL-CCNC: 23 U/L (ref 5–40)
BILIRUB SERPL-MCNC: 0.7 MG/DL (ref 0.3–1.2)
BILIRUBIN DIRECT: 0.3 MG/DL (ref 0–0.3)
BUN BLDV-MCNC: 16 MG/DL (ref 7–22)
CALCIUM SERPL-MCNC: 7.8 MG/DL (ref 8.5–10.5)
CHLORIDE BLD-SCNC: 101 MEQ/L (ref 98–111)
CO2: 27 MEQ/L (ref 23–33)
CREAT SERPL-MCNC: 0.7 MG/DL (ref 0.4–1.2)
ERYTHROCYTE [DISTWIDTH] IN BLOOD BY AUTOMATED COUNT: 15.6 % (ref 11.5–14.5)
ERYTHROCYTE [DISTWIDTH] IN BLOOD BY AUTOMATED COUNT: 52.5 FL (ref 35–45)
GFR SERPL CREATININE-BSD FRML MDRD: > 90 ML/MIN/1.73M2
GLUCOSE BLD-MCNC: 114 MG/DL (ref 70–108)
GLUCOSE BLD-MCNC: 139 MG/DL (ref 70–108)
GLUCOSE BLD-MCNC: 140 MG/DL (ref 70–108)
HCT VFR BLD CALC: 39.1 % (ref 42–52)
HEMOGLOBIN: 12.7 GM/DL (ref 14–18)
LIPASE: 126.1 U/L (ref 5.6–51.3)
MCH RBC QN AUTO: 29.9 PG (ref 26–33)
MCHC RBC AUTO-ENTMCNC: 32.5 GM/DL (ref 32.2–35.5)
MCV RBC AUTO: 92 FL (ref 80–94)
PHOSPHORUS: 1.8 MG/DL (ref 2.4–4.7)
PLATELET # BLD: 164 THOU/MM3 (ref 130–400)
PMV BLD AUTO: 10.8 FL (ref 9.4–12.4)
POTASSIUM REFLEX MAGNESIUM: 3.8 MEQ/L (ref 3.5–5.2)
POTASSIUM SERPL-SCNC: 3.8 MEQ/L (ref 3.5–5.2)
RBC # BLD: 4.25 MILL/MM3 (ref 4.7–6.1)
SODIUM BLD-SCNC: 138 MEQ/L (ref 135–145)
TOTAL PROTEIN: 5.8 G/DL (ref 6.1–8)
WBC # BLD: 17.4 THOU/MM3 (ref 4.8–10.8)

## 2019-03-29 PROCEDURE — 85027 COMPLETE CBC AUTOMATED: CPT

## 2019-03-29 PROCEDURE — 83690 ASSAY OF LIPASE: CPT

## 2019-03-29 PROCEDURE — 84100 ASSAY OF PHOSPHORUS: CPT

## 2019-03-29 PROCEDURE — 82150 ASSAY OF AMYLASE: CPT

## 2019-03-29 PROCEDURE — 82948 REAGENT STRIP/BLOOD GLUCOSE: CPT

## 2019-03-29 PROCEDURE — 36415 COLL VENOUS BLD VENIPUNCTURE: CPT

## 2019-03-29 PROCEDURE — 82248 BILIRUBIN DIRECT: CPT

## 2019-03-29 PROCEDURE — 2709999900 HC NON-CHARGEABLE SUPPLY

## 2019-03-29 PROCEDURE — 6360000002 HC RX W HCPCS: Performed by: FAMILY MEDICINE

## 2019-03-29 PROCEDURE — 6360000002 HC RX W HCPCS: Performed by: NURSE PRACTITIONER

## 2019-03-29 PROCEDURE — 80053 COMPREHEN METABOLIC PANEL: CPT

## 2019-03-29 PROCEDURE — 99239 HOSP IP/OBS DSCHRG MGMT >30: CPT | Performed by: FAMILY MEDICINE

## 2019-03-29 RX ORDER — CIPROFLOXACIN 500 MG/1
500 TABLET, FILM COATED ORAL 2 TIMES DAILY
Qty: 14 TABLET | Refills: 0 | Status: SHIPPED | OUTPATIENT
Start: 2019-03-29 | End: 2019-04-08

## 2019-03-29 RX ADMIN — KETOROLAC TROMETHAMINE 15 MG: 30 INJECTION, SOLUTION INTRAMUSCULAR at 01:36

## 2019-03-29 RX ADMIN — CIPROFLOXACIN 400 MG: 2 INJECTION, SOLUTION INTRAVENOUS at 01:34

## 2019-03-29 ASSESSMENT — PAIN SCALES - GENERAL
PAINLEVEL_OUTOF10: 0
PAINLEVEL_OUTOF10: 4
PAINLEVEL_OUTOF10: 0
PAINLEVEL_OUTOF10: 0

## 2019-03-29 NOTE — DISCHARGE INSTR - DIET
 Good nutrition is important when healing from an illness, injury, or surgery. Follow any nutrition recommendations given to you during your hospital stay.  If you were given an oral nutrition supplement while in the hospital, continue to take this supplement at home. You can take it with meals, in-between meals, and/or before bedtime. These supplements can be purchased at most local grocery stores, pharmacies, and chain super-stores.  If you have any questions about your diet or nutrition, call the hospital and ask for the dietitian. You are recommended to follow a low fat diet    What will the results be? Losing excess weight will make your whole body healthier. You will have more energy for your daily activities and lower your risk for health problems. What lifestyle changes are needed? Stay active. Eating healthy is not always enough to lose weight. Burning calories by exercising is a big part of weight loss. What foods are good to eat? The key is to watch your portion sizes. It is best to choose foods that are lower in fat and calories. Choose low-fat meats:  Boneless chicken breast  Pork loin  90% lean beef  Lean turkey meat  Fresh fish (not fried)  Choose low-fat dairy products:  1% or skim milk  Spray butter or margarine  Low-fat or fat-free cheese  Frozen yogurt or low calorie ice cream  Choose fresh fruits, green vegetables, beans and lentils, and whole wheat products more often. Choose smart snacks:  Baked chips  Pretzels  Popcorn with no butter ? use some pepper or other spice to taste  High fiber, low-fat crackers  Reduced fat cookies  Diet or no-calorie beverages  What foods should be limited or avoided?    Limit high-fat, high-sodium, high-calorie foods like:  Fried foods  Processed meats  Whole-fat dairy products  Candy, cookies, chips, pastries  Sausage, park, any full-fat meats  Soda, juice  Beer, wine, and mixed drinks (alcohol)

## 2019-03-29 NOTE — CARE COORDINATION
3/29/19, 1:07 PM    Spoke with Edvin Matt; states he is ready for discharge. Home with wife. Denies needs, declines HH. Discharge plan discussed by  and . Discharge plan reviewed with patient/ family. Patient/ family verbalize understanding of discharge plan and are in agreement with plan. Understanding was demonstrated using the teach back method.        IMM Letter  IMM Letter given to Patient/Family/Significant other/Guardian/POA/by[de-identified]   IMM Letter date given[de-identified] 03/29/19  IMM Letter time given[de-identified] 8767

## 2019-03-29 NOTE — DISCHARGE SUMMARY
Hospital Medicine Discharge Summary      Patient Identification:   Chris Mccoy   : 1945  MRN: [id number]   Account: [de-identified]      Patient's PCP: Giuliano Lua MD    Admit Date: 3/25/2019     Discharge Date:       Admitting Physician: Sheila George MD     Discharge Physician: Conchis Matthews MD     Discharge Diagnoses: Active Hospital Problems    Diagnosis Date Noted    Pancreatitis, unspecified pancreatitis type [K85.90] 2019    Hyperlipidemia [E78.5]     Essential hypertension [I10]     Hyponatremia [E87.1]        The patient was seen and examined on day of discharge and this discharge summary is in conjunction with any daily progress note from day of discharge. Hospital Course:   1) Acute on Chronic Pancreatitis with Klebsiella bacteremia  - Pt was provided pain control with demerol PRN, toradol PRN  - BC: 3/25 - gram negative rods --> Klebsiella  - Lipase 126 at discharge  - D/C ana m and start cipro (3/28) given his sensitivities.   Pt will be sent home with 7 more days of PO cipro  - Repeat BC for clearance (3/27) negative X 48 hrs  - Tolerating PO intake well  - Pt resume low fat diet at home     2) Hypometria  - Resolved with IVF hydration     3) JEANNE  - Improved with IVF hydration     4) HLD  - Zocor on hold 2/2 to #1  - May resume statin at discharge    5) Hyperglycemia  - Pt was placed on SSI    Exam:     Vitals:  Vitals:    19 0029 19 0434 19 0849 19 1132   BP: (!) 147/72 (!) 159/113 (!) 153/80 (!) 177/81   Pulse:  70 74 78   Resp:  20 16 18   Temp:  98.4 °F (36.9 °C) 98.4 °F (36.9 °C) 98.9 °F (37.2 °C)   TempSrc:  Oral Oral Oral   SpO2:  93% 92% 92%   Weight:         Weight: Weight: 234 lb 6.4 oz (106.3 kg)     24 hour intake/output:    Intake/Output Summary (Last 24 hours) at 3/29/2019 1249  Last data filed at 3/29/2019 0322  Gross per 24 hour   Intake 5264.08 ml   Output 0 ml   Net 5264.08 ml       General appearance:  No apparent distress, appears stated age and cooperative. HEENT:  Normal cephalic, atraumatic without obvious deformity. Pupils equal, round, and reactive to light. Extra ocular muscles intact. Conjunctivae/corneas clear. Neck: Supple, with full range of motion. No jugular venous distention. Trachea midline. Respiratory:  Normal respiratory effort. Clear to auscultation, bilaterally without Rales/Wheezes/Rhonchi. Cardiovascular:  Regular rate and rhythm with normal S1/S2 without murmurs, rubs or gallops. Abdomen: Soft, non-tender, non-distended with normal bowel sounds. Musculoskeletal:  No clubbing, cyanosis or edema bilaterally. Full range of motion without deformity. Skin: Skin color, texture, turgor normal.  No rashes or lesions. Neurologic:  Neurovascularly intact without any focal sensory/motor deficits. Cranial nerves: II-XII intact, grossly non-focal.  Psychiatric:  Alert and oriented, thought content appropriate, normal insight  Capillary Refill: Brisk,< 3 seconds   Peripheral Pulses: +2 palpable, equal bilaterally       Labs: For convenience and continuity at follow-up the following most recent labs are provided:      CBC:    Lab Results   Component Value Date    WBC 17.4 03/29/2019    HGB 12.7 03/29/2019    HCT 39.1 03/29/2019     03/29/2019       Renal:    Lab Results   Component Value Date     03/29/2019    K 3.8 03/29/2019    K 3.8 03/29/2019     03/29/2019    CO2 27 03/29/2019    BUN 16 03/29/2019    CREATININE 0.7 03/29/2019    CALCIUM 7.8 03/29/2019    PHOS 1.8 03/29/2019         Significant Diagnostic Studies    Radiology:   US RENAL COMPLETE   Final Result       1. Small bilateral nonobstructing renal cystic changes. 2. Mild elevation of renal artery resistive index values, correlate with changes related to renal artery stenosis   3. Moderate postvoid residual volume of 98 mL. **This report has been created using voice recognition software.  It may contain minor errors which are inherent in voice recognition technology. **      Final report electronically signed by Dr. Johnathan Szymanski on 3/26/2019 3:34 AM      [address]   Final Result   1. Hepatomegaly with fatty liver changes. Upper   2. Cholelithiasis. 3. Unremarkable pancreas. **This report has been created using voice recognition software. It may contain minor errors which are inherent in voice recognition technology. **      Final report electronically signed by Dr. Johnathan Szymanski on 3/26/2019 3:47 AM      US PANCREAS   Final Result   1. Hepatomegaly with fatty liver changes. Upper   2. Cholelithiasis. 3. Unremarkable pancreas. **This report has been created using voice recognition software. It may contain minor errors which are inherent in voice recognition technology. **      Final report electronically signed by Dr. Johnathan Szymanski on 3/26/2019 3:47 AM      US LIVER   Final Result   1. Hepatomegaly with fatty liver changes. Upper   2. Cholelithiasis. 3. Unremarkable pancreas. **This report has been created using voice recognition software. It may contain minor errors which are inherent in voice recognition technology. **      Final report electronically signed by Dr. Johnathan Szymanski on 3/26/2019 3:47 AM             Consults:     IP CONSULT TO INFECTIOUS DISEASES    Disposition: Home  Condition at Discharge: Stable    Code Status:  Full Code     Patient Instructions:    Discharge lab work: Activity: activity as tolerated  Diet: DIET LOW FAT;       Follow-up visits:   MD Ainsley Molina [address] [facility] [address]  [phone]    Follow up with PCP in 1 week      Discharge Medications:      [address] [place] [address] Medication Instructions Q:[id number]    Printed on:03/29/19 1252   Medication Information                      ciprofloxacin (CIPRO) 500 MG tablet  Take 1 tablet by mouth 2 times daily for 10 days             lisinopril

## 2019-04-02 LAB — BLOOD CULTURE, ROUTINE: NORMAL

## 2019-04-04 ENCOUNTER — HOSPITAL ENCOUNTER (OUTPATIENT)
Age: 74
Discharge: HOME | End: 2019-04-04
Payer: MEDICARE

## 2019-04-04 LAB
ALBUMIN SERPL-MCNC: 2.8 G/DL (ref 3.5–5.2)
ALBUMIN/GLOBULIN RATIO: 0.7 (ref 1–2.5)
ALP BLD-CCNC: 122 U/L (ref 40–129)
ALT SERPL-CCNC: 55 U/L (ref 5–41)
AMYLASE: 113 U/L (ref 28–100)
ANION GAP SERPL CALCULATED.3IONS-SCNC: 11 MMOL/L (ref 9–17)
AST SERPL-CCNC: 46 U/L
BILIRUB SERPL-MCNC: 0.72 MG/DL (ref 0.3–1.2)
BUN BLDV-MCNC: 10 MG/DL (ref 8–23)
BUN/CREAT BLD: 13 (ref 9–20)
C-REACTIVE PROTEIN: 130.1 MG/L (ref 0–5)
CALCIUM SERPL-MCNC: 8.9 MG/DL (ref 8.6–10.4)
CHLORIDE BLD-SCNC: 96 MMOL/L (ref 98–107)
CO2: 30 MMOL/L (ref 20–31)
CREAT SERPL-MCNC: 0.77 MG/DL (ref 0.7–1.2)
GFR AFRICAN AMERICAN: >60 ML/MIN
GFR NON-AFRICAN AMERICAN: >60 ML/MIN
GFR SERPL CREATININE-BSD FRML MDRD: ABNORMAL ML/MIN/{1.73_M2}
GFR SERPL CREATININE-BSD FRML MDRD: ABNORMAL ML/MIN/{1.73_M2}
GLUCOSE BLD-MCNC: 125 MG/DL (ref 70–99)
LIPASE: 89 U/L (ref 13–60)
POTASSIUM SERPL-SCNC: 4.6 MMOL/L (ref 3.7–5.3)
SODIUM BLD-SCNC: 137 MMOL/L (ref 135–144)
TOTAL PROTEIN: 6.8 G/DL (ref 6.4–8.3)

## 2019-04-04 PROCEDURE — 80053 COMPREHEN METABOLIC PANEL: CPT

## 2019-04-04 PROCEDURE — 86140 C-REACTIVE PROTEIN: CPT

## 2019-04-04 PROCEDURE — 83690 ASSAY OF LIPASE: CPT

## 2019-04-04 PROCEDURE — 36415 COLL VENOUS BLD VENIPUNCTURE: CPT

## 2019-04-04 PROCEDURE — 82150 ASSAY OF AMYLASE: CPT

## 2021-07-07 NOTE — PROGRESS NOTES
Discharge teaching and instructions for diagnosis/procedure of Pancreatitis completed with patient using teachback method. AVS reviewed. Printed prescriptions given to patient. Patient voiced understanding regarding prescriptions, follow up appointments, and care of self at home. Discharged in a wheelchair to  home with support per family. All questions answered. Statement Selected